# Patient Record
Sex: FEMALE | Race: WHITE | Employment: UNEMPLOYED | URBAN - METROPOLITAN AREA
[De-identification: names, ages, dates, MRNs, and addresses within clinical notes are randomized per-mention and may not be internally consistent; named-entity substitution may affect disease eponyms.]

---

## 2017-08-14 PROBLEM — R74.8 ABNORMAL TRANSAMINASES: Status: ACTIVE | Noted: 2017-08-14

## 2017-08-14 PROBLEM — E11.10 DIABETIC KETOACIDOSIS WITHOUT COMA ASSOCIATED WITH TYPE 2 DIABETES MELLITUS (HCC): Status: ACTIVE | Noted: 2017-08-14

## 2017-08-14 PROBLEM — R10.11 RIGHT UPPER QUADRANT ABDOMINAL PAIN: Status: ACTIVE | Noted: 2017-08-14

## 2018-03-29 ENCOUNTER — HOSPITAL ENCOUNTER (OUTPATIENT)
Dept: GENERAL RADIOLOGY | Age: 58
Discharge: HOME | End: 2018-03-31
Payer: COMMERCIAL

## 2018-03-29 ENCOUNTER — HOSPITAL ENCOUNTER (OUTPATIENT)
Age: 58
Discharge: HOME | End: 2018-03-31
Payer: COMMERCIAL

## 2018-03-29 DIAGNOSIS — M79.644 PAIN OF RIGHT MIDDLE FINGER: ICD-10-CM

## 2018-03-29 PROCEDURE — 73130 X-RAY EXAM OF HAND: CPT

## 2018-08-23 ENCOUNTER — HOSPITAL ENCOUNTER (OUTPATIENT)
Dept: NON INVASIVE DIAGNOSTICS | Age: 58
Discharge: HOME | End: 2018-08-23
Payer: COMMERCIAL

## 2018-08-23 LAB
LV EF: 65 %
LVEF MODALITY: NORMAL

## 2018-08-23 PROCEDURE — 93306 TTE W/DOPPLER COMPLETE: CPT

## 2018-09-10 ENCOUNTER — OFFICE VISIT (OUTPATIENT)
Dept: VASCULAR SURGERY | Age: 58
End: 2018-09-10
Payer: COMMERCIAL

## 2018-09-10 DIAGNOSIS — G57.93 NEUROPATHY OF BOTH FEET: ICD-10-CM

## 2018-09-10 DIAGNOSIS — I89.0 LYMPHEDEMA OF BOTH LOWER EXTREMITIES: Primary | ICD-10-CM

## 2018-09-10 PROCEDURE — G8421 BMI NOT CALCULATED: HCPCS | Performed by: SURGERY

## 2018-09-10 PROCEDURE — 1036F TOBACCO NON-USER: CPT | Performed by: SURGERY

## 2018-09-10 PROCEDURE — 99203 OFFICE O/P NEW LOW 30 MIN: CPT | Performed by: NURSE PRACTITIONER

## 2018-09-10 PROCEDURE — 3017F COLORECTAL CA SCREEN DOC REV: CPT | Performed by: NURSE PRACTITIONER

## 2018-09-10 PROCEDURE — G8427 DOCREV CUR MEDS BY ELIG CLIN: HCPCS | Performed by: NURSE PRACTITIONER

## 2018-09-10 PROCEDURE — 3017F COLORECTAL CA SCREEN DOC REV: CPT | Performed by: SURGERY

## 2018-09-10 PROCEDURE — G8421 BMI NOT CALCULATED: HCPCS | Performed by: NURSE PRACTITIONER

## 2018-09-10 PROCEDURE — G8427 DOCREV CUR MEDS BY ELIG CLIN: HCPCS | Performed by: SURGERY

## 2018-09-10 PROCEDURE — 1036F TOBACCO NON-USER: CPT | Performed by: NURSE PRACTITIONER

## 2018-09-10 RX ORDER — TIZANIDINE 4 MG/1
4 TABLET ORAL EVERY 8 HOURS PRN
COMMUNITY

## 2018-09-10 RX ORDER — POLYETHYLENE GLYCOL 1450
POWDER (GRAM) MISCELLANEOUS
COMMUNITY
End: 2021-04-10 | Stop reason: ALTCHOICE

## 2018-09-10 RX ORDER — TRAMADOL HYDROCHLORIDE 50 MG/1
50 TABLET ORAL
COMMUNITY
End: 2021-04-10 | Stop reason: ALTCHOICE

## 2018-09-10 RX ORDER — CELECOXIB 100 MG/1
100 CAPSULE ORAL 2 TIMES DAILY
COMMUNITY
End: 2021-04-10 | Stop reason: ALTCHOICE

## 2018-09-10 RX ORDER — BIOTIN 1 MG
5000 TABLET ORAL
COMMUNITY
End: 2021-04-10 | Stop reason: ALTCHOICE

## 2018-09-10 NOTE — PROGRESS NOTES
tiZANidine (ZANAFLEX) 4 MG tablet Take 4 mg by mouth every 8 hours as needed      Topiramate (TOPAMAX PO) Take by mouth      traMADol (ULTRAM) 50 MG tablet Take 50 mg by mouth. Susan Oleary Polyethylene Glycol POWD by Does not apply route      MAGNESIUM CITRATE PO Take by mouth      Biotin 1000 MCG TABS Take 5,000 mcg by mouth      Linaclotide (LINZESS PO) Take by mouth      levothyroxine (SYNTHROID) 75 MCG tablet Take 75 mcg by mouth Daily       metoprolol (TOPROL-XL) 25 MG XL tablet Take 25 mg by mouth daily       pregabalin (LYRICA) 100 MG capsule Take 200 mg by mouth 2 times daily      oxyCODONE-acetaminophen (PERCOCET) 5-325 MG per tablet Take 1 tablet by mouth every 8 hours as needed for Pain .  methocarbamol (ROBAXIN) 750 MG tablet Take 750 mg by mouth every 6 hours as needed      meloxicam (MOBIC) 15 MG tablet Take 15 mg by mouth daily      insulin glargine (TOUJEO SOLOSTAR) 300 UNIT/ML injection pen Inject 10 Units into the skin every morning       alogliptin-metformin 12.5-1000 MG TABS Take by mouth 2 times daily      glipiZIDE (GLUCOTROL) 5 MG tablet Take 10 mg by mouth 2 times daily (before meals)       spironolactone (ALDACTONE) 100 MG tablet Take 100 mg by mouth daily. No current facility-administered medications for this visit. Allergies:  Latex; Ancef [cefazolin]; and Codeine  Social History     Social History    Marital status:      Spouse name: N/A    Number of children: N/A    Years of education: N/A     Occupational History    Not on file.      Social History Main Topics    Smoking status: Former Smoker     Packs/day: 1.00     Types: Cigarettes     Quit date: 10/7/2012    Smokeless tobacco: Never Used      Comment: quit 2011    Alcohol use No    Drug use: No    Sexual activity: Not on file     Other Topics Concern    Not on file     Social History Narrative    No narrative on file        Family History   Problem Relation Age of Onset    Cancer Father bladder and colon    Heart Attack Brother          at 46     Labs  Lab Results   Component Value Date    WBC 20.1 (H) 2017    HGB 12.8 2017    HCT 41.2 2017     2017    PROTIME 11.9 2016    INR 1.1 2016    APTT 28.1 2016    K 3.9 2017    BUN 8 2017    CREATININE 0.6 2017     PHYSICAL EXAM:    CONSTITUTIONAL:   Awake, alert, cooperative  PSYCHIATRIC :  Oriented to time, place and person     Appropriate insight to disease process  EYES: Lids and lashes normal  ENT:  External ears and nose without lesions   Hearing deficits absent  NECK: Supple, symmetrical, trachea midline   Thyroid goiter not appreciated   Carotid bruit absent  LUNGS:  No increased work of breathing                 Clear to auscultation  CARDIOVASCULAR:  regular rate and rhythm   ABDOMEN:  soft, non-distended, non-tender   Hernias present   Aorta is not palpable  Lymphatics : Cervical lymphadenopathy absent   SKIN:   Normal skin color   Texture and turgor normal, no induration  EXTREMITIES:   R UE 5/5 strength   No cyanosis noted in nail beds  L UE 5/5 strength   No cyanosis noted in nail beds  R LE Edema present   Varicose veins present   L LE Edema present   Varicose veins present   R femoral  L femoral    R dorsalis pedis triphasic L dorsalis pedis triphasic   R posterior tibial triphasic L posterior tibial triphasic     RADIOLOGY:    A/P Bilateral LE Swelling  Lymphedema  · I explained to them the pathophysiology of lyphedema and the symptoms associated with it  · Pt is unable to tolerate compression stockings  · Recommend lymphedema therapy- referral made  · Pt is unsure if she wants to start because she is trying to lose weight swimming and will be unable to swim in wraps  · Elevate Bilateral LE while in bed or sitting  · F/U as outpatient 3 months after pt has started lymphedema therapy   · She will call for f/u appointment depending on when she starts therapy  · Call if patient develops worsening of swelling or wounds  · All ?s answered    A/p Bilateral diabetic neuropathy  · Continue medical management per Dr. Tatum Thomas    Pt seen and plan reviewed with Dr. Adelso Chandler.      Geraldo Miller, CNP

## 2018-10-08 ENCOUNTER — INITIAL CONSULT (OUTPATIENT)
Dept: SURGERY | Age: 58
End: 2018-10-08
Payer: COMMERCIAL

## 2018-10-08 VITALS
SYSTOLIC BLOOD PRESSURE: 116 MMHG | HEART RATE: 82 BPM | HEIGHT: 66 IN | WEIGHT: 293 LBS | BODY MASS INDEX: 47.09 KG/M2 | DIASTOLIC BLOOD PRESSURE: 78 MMHG

## 2018-10-08 DIAGNOSIS — K43.2 INCISIONAL HERNIA, WITHOUT OBSTRUCTION OR GANGRENE: Primary | ICD-10-CM

## 2018-10-08 DIAGNOSIS — R10.9 ABDOMINAL PAIN, ACUTE: ICD-10-CM

## 2018-10-08 DIAGNOSIS — K59.04 CHRONIC IDIOPATHIC CONSTIPATION: ICD-10-CM

## 2018-10-08 PROCEDURE — G8417 CALC BMI ABV UP PARAM F/U: HCPCS | Performed by: SURGERY

## 2018-10-08 PROCEDURE — 99204 OFFICE O/P NEW MOD 45 MIN: CPT | Performed by: SURGERY

## 2018-10-08 PROCEDURE — G8484 FLU IMMUNIZE NO ADMIN: HCPCS | Performed by: SURGERY

## 2018-10-08 PROCEDURE — G8427 DOCREV CUR MEDS BY ELIG CLIN: HCPCS | Performed by: SURGERY

## 2018-10-08 PROCEDURE — 3017F COLORECTAL CA SCREEN DOC REV: CPT | Performed by: SURGERY

## 2018-10-08 PROCEDURE — 1036F TOBACCO NON-USER: CPT | Performed by: SURGERY

## 2018-10-08 NOTE — PROGRESS NOTES
narrative on file           Review of Systems  Review of Systems -  General ROS: negative for - chills, fatigue or malaise  ENT ROS: negative for - hearing change, nasal congestion or nasal discharge  Allergy and Immunology ROS: negative for - hives, itchy/watery eyes or nasal congestion  Hematological and Lymphatic ROS: negative for - bruising or fatigue  Endocrine ROS: negative for - malaise/lethargy, mood swings, palpitations or polydipsia/polyuria  Respiratory ROS: negative for - sputum changes, stridor, tachypnea or wheezing  Cardiovascular ROS: negative for - irregular heartbeat, loss of consciousness, murmur or orthopnea  Gastrointestinal ROS: negative for -  heartburn or hematemesis  Genito-Urinary ROS: negative for -  genital discharge, genital ulcers or hematuria  Musculoskeletal ROS: negative for - gait disturbance, muscle pain or muscular weakness  Psych/Soc ROS: Neg suicidal ideation or visual/auditory hallucinations    Physical exam:   /78 (Site: Right Upper Arm, Position: Sitting, Cuff Size: Large Adult)   Pulse 82   Ht 5' 6\" (1.676 m)   Wt (!) 304 lb (137.9 kg)   LMP 12/20/2014 Comment: irregular  bleeding  BMI 49.07 kg/m²   General appearance:  NAD  Head: NCAT, PERRLA, EOMI, red conjunctiva  Neck: supple, no masses  Lungs: Equal chest rise bilateral  Heart: Reg rate  Abdomen: soft, minimal epigastric tender, morbidly obese, mild distended, incarcerated umbilical hernia, abdominal wall laxity, no definite recurrent upper abdominal hernia  Rectal: No bleeding  Skin; no lesions  Gu: no cva tenderness  Extremities: bilateral edema  Psychosocial: No auditory or visual hallucinations      Radiology:  CT abdomen/pelvis: She has had one in Aug 2017 and Aug 2018 that were reviewed. She has a moderate umbilical hernia containing fat. She has abdominal wall laxity and thinning of abdominal wall muscles but no recurrent incisional hernia.      Assessment:  Florencio Llamas is a 61 y.o. female with

## 2021-01-26 ENCOUNTER — TELEPHONE (OUTPATIENT)
Dept: SURGERY | Age: 61
End: 2021-01-26

## 2021-01-28 ENCOUNTER — OFFICE VISIT (OUTPATIENT)
Dept: SURGERY | Age: 61
End: 2021-01-28
Payer: COMMERCIAL

## 2021-01-28 ENCOUNTER — HOSPITAL ENCOUNTER (OUTPATIENT)
Age: 61
Discharge: HOME | End: 2021-01-28
Payer: COMMERCIAL

## 2021-01-28 VITALS
TEMPERATURE: 97.6 F | SYSTOLIC BLOOD PRESSURE: 150 MMHG | HEART RATE: 76 BPM | DIASTOLIC BLOOD PRESSURE: 91 MMHG | HEIGHT: 66 IN | BODY MASS INDEX: 47.09 KG/M2 | WEIGHT: 293 LBS

## 2021-01-28 DIAGNOSIS — R10.84 DIFFUSE ABDOMINAL PAIN: Primary | ICD-10-CM

## 2021-01-28 DIAGNOSIS — R10.84 DIFFUSE ABDOMINAL PAIN: ICD-10-CM

## 2021-01-28 LAB
ANION GAP SERPL CALCULATED.3IONS-SCNC: 11 MMOL/L (ref 7–16)
BUN BLDV-MCNC: 13 MG/DL (ref 8–23)
CALCIUM SERPL-MCNC: 9.3 MG/DL (ref 8.6–10.2)
CHLORIDE BLD-SCNC: 107 MMOL/L (ref 98–107)
CO2: 27 MMOL/L (ref 22–29)
CREAT SERPL-MCNC: 0.8 MG/DL (ref 0.5–1)
GFR AFRICAN AMERICAN: >60
GFR NON-AFRICAN AMERICAN: >60 ML/MIN/1.73
GLUCOSE BLD-MCNC: 110 MG/DL (ref 74–99)
POTASSIUM SERPL-SCNC: 4.6 MMOL/L (ref 3.5–5)
SODIUM BLD-SCNC: 145 MMOL/L (ref 132–146)

## 2021-01-28 PROCEDURE — 80048 BASIC METABOLIC PNL TOTAL CA: CPT

## 2021-01-28 PROCEDURE — 36415 COLL VENOUS BLD VENIPUNCTURE: CPT

## 2021-01-28 PROCEDURE — 3017F COLORECTAL CA SCREEN DOC REV: CPT | Performed by: SURGERY

## 2021-01-28 PROCEDURE — G8484 FLU IMMUNIZE NO ADMIN: HCPCS | Performed by: SURGERY

## 2021-01-28 PROCEDURE — 99204 OFFICE O/P NEW MOD 45 MIN: CPT | Performed by: SURGERY

## 2021-01-28 PROCEDURE — G8427 DOCREV CUR MEDS BY ELIG CLIN: HCPCS | Performed by: SURGERY

## 2021-01-28 PROCEDURE — G8417 CALC BMI ABV UP PARAM F/U: HCPCS | Performed by: SURGERY

## 2021-01-28 PROCEDURE — 1036F TOBACCO NON-USER: CPT | Performed by: SURGERY

## 2021-01-28 RX ORDER — DICLOFENAC EPOLAMINE 0.01 G/1
1 SYSTEM TOPICAL 2 TIMES DAILY
COMMUNITY

## 2021-01-28 RX ORDER — LANCETS 28 GAUGE
1 EACH MISCELLANEOUS DAILY
COMMUNITY

## 2021-01-28 RX ORDER — NYSTATIN 10B UNIT
POWDER (EA) MISCELLANEOUS 2 TIMES DAILY
COMMUNITY

## 2021-01-28 NOTE — PROGRESS NOTES
 nystatin (MYCOSTATIN) POWD powder Apply topically 2 times daily      diclofenac (FLECTOR) 1.3 % PTCH patch Place 1 patch onto the skin 2 times daily      diclofenac sodium (VOLTAREN) 1 % GEL Apply topically 2 times daily      FreeStyle Lancets MISC 1 each by Does not apply route daily      blood glucose test strips (ASCENSIA AUTODISC VI;ONE TOUCH ULTRA TEST VI) strip 1 each by In Vitro route daily As needed.  metFORMIN (GLUCOPHAGE) 1000 MG tablet Take 1,000 mg by mouth 2 times daily (with meals)      Dulaglutide (TRULICITY SC) Inject 0.5 mLs into the skin once a week      tiZANidine (ZANAFLEX) 4 MG tablet Take 4 mg by mouth every 8 hours as needed      Topiramate (TOPAMAX PO) Take by mouth      MAGNESIUM CITRATE PO Take by mouth      levothyroxine (SYNTHROID) 75 MCG tablet Take 75 mcg by mouth Daily       metoprolol (TOPROL-XL) 25 MG XL tablet Take 25 mg by mouth daily       celecoxib (CELEBREX) 100 MG capsule Take 100 mg by mouth 2 times daily      Ertugliflozin L-PyroglutamicAc (STEGLATRO) 15 MG TABS Take 15 mg by mouth daily      Insulin Aspart (NOVOLOG SC) Inject 8 Units into the skin      traMADol (ULTRAM) 50 MG tablet Take 50 mg by mouth. Shellie Solders Polyethylene Glycol POWD by Does not apply route      Biotin 1000 MCG TABS Take 5,000 mcg by mouth      pregabalin (LYRICA) 100 MG capsule Take 200 mg by mouth 2 times daily      oxyCODONE-acetaminophen (PERCOCET) 5-325 MG per tablet Take 1 tablet by mouth every 8 hours as needed for Pain .       methocarbamol (ROBAXIN) 750 MG tablet Take 750 mg by mouth every 6 hours as needed      meloxicam (MOBIC) 15 MG tablet Take 15 mg by mouth daily      Linaclotide (LINZESS PO) Take by mouth      insulin glargine (TOUJEO SOLOSTAR) 300 UNIT/ML injection pen Inject 10 Units into the skin every morning       alogliptin-metformin 12.5-1000 MG TABS Take by mouth 2 times daily Emotionally abused: Not on file     Physically abused: Not on file     Forced sexual activity: Not on file   Other Topics Concern    Not on file   Social History Narrative    Not on file           Review of Systems  Review of Systems -  General ROS: negative for - chills, fatigue or malaise  ENT ROS: negative for - hearing change, nasal congestion or nasal discharge  Allergy and Immunology ROS: negative for - hives, itchy/watery eyes or nasal congestion  Hematological and Lymphatic ROS: negative for - blood clots, blood transfusions, bruising or fatigue  Endocrine ROS: negative for - malaise/lethargy, mood swings, palpitations or polydipsia/polyuria  Respiratory ROS: negative for - sputum changes, stridor, tachypnea or wheezing  Cardiovascular ROS: negative for - irregular heartbeat, loss of consciousness, murmur or orthopnea  Gastrointestinal ROS: negative for - constipation, diarrhea, gas/bloating, heartburn or hematemesis  Genito-Urinary ROS: negative for -  genital discharge, genital ulcers or hematuria  Musculoskeletal ROS: negative for - gait disturbance, muscle pain or muscular weakness    Physical exam:   BP (!) 150/91   Pulse 76   Temp 97.6 °F (36.4 °C)   Ht 5' 6\" (1.676 m)   Wt (!) 321 lb (145.6 kg)   LMP 12/20/2014 Comment: irregular  bleeding  BMI 51.81 kg/m²   General appearance:  NAD  Head: NCAT, PERRLA, EOMI, red conjunctiva  Neck: supple, no masses  Lungs: CTAB, equal chest rise bilateral  Heart: Reg rate  Abdomen: soft, moderately tender diffusely, nondistended  Skin; no lesions  Gu: no cva tenderness  Extremities: extremities normal, atraumatic, no cyanosis or edema      Radiology:  CT abdomen/pelvis: pending    Assessment:  63 y.o. female with diffuse severe abdominal pain with complex surgical hx    Plan: Will do CT to assess due to severity and complex surgical hx.      Wanda Gracia MD  11:20 AM  1/28/2021

## 2021-02-08 ENCOUNTER — HOSPITAL ENCOUNTER (OUTPATIENT)
Dept: CT IMAGING | Age: 61
Discharge: HOME | End: 2021-02-08
Payer: COMMERCIAL

## 2021-02-08 DIAGNOSIS — R10.84 DIFFUSE ABDOMINAL PAIN: ICD-10-CM

## 2021-02-08 PROCEDURE — 6360000004 HC RX CONTRAST MEDICATION: Performed by: RADIOLOGY

## 2021-02-08 PROCEDURE — 74177 CT ABD & PELVIS W/CONTRAST: CPT

## 2021-02-08 RX ADMIN — IOHEXOL 50 ML: 240 INJECTION, SOLUTION INTRATHECAL; INTRAVASCULAR; INTRAVENOUS; ORAL at 10:41

## 2021-02-08 RX ADMIN — IOPAMIDOL 75 ML: 755 INJECTION, SOLUTION INTRAVENOUS at 10:41

## 2021-02-11 ENCOUNTER — OFFICE VISIT (OUTPATIENT)
Dept: SURGERY | Age: 61
End: 2021-02-11
Payer: COMMERCIAL

## 2021-02-11 VITALS
TEMPERATURE: 96.4 F | WEIGHT: 293 LBS | OXYGEN SATURATION: 97 % | HEIGHT: 66 IN | BODY MASS INDEX: 47.09 KG/M2 | SYSTOLIC BLOOD PRESSURE: 140 MMHG | DIASTOLIC BLOOD PRESSURE: 87 MMHG | HEART RATE: 75 BPM

## 2021-02-11 DIAGNOSIS — K43.2 INCISIONAL HERNIA, WITHOUT OBSTRUCTION OR GANGRENE: Primary | ICD-10-CM

## 2021-02-11 PROCEDURE — G8484 FLU IMMUNIZE NO ADMIN: HCPCS | Performed by: SURGERY

## 2021-02-11 PROCEDURE — G8417 CALC BMI ABV UP PARAM F/U: HCPCS | Performed by: SURGERY

## 2021-02-11 PROCEDURE — 3017F COLORECTAL CA SCREEN DOC REV: CPT | Performed by: SURGERY

## 2021-02-11 PROCEDURE — G8427 DOCREV CUR MEDS BY ELIG CLIN: HCPCS | Performed by: SURGERY

## 2021-02-11 PROCEDURE — 99213 OFFICE O/P EST LOW 20 MIN: CPT | Performed by: SURGERY

## 2021-02-11 PROCEDURE — 1036F TOBACCO NON-USER: CPT | Performed by: SURGERY

## 2021-02-11 NOTE — PROGRESS NOTES
General Surgery History and Physical    Patient's Name/Date of Birth: Franco Garner / 1960    Date: 2021     Surgeon: Jan Minaya M.D.    PCP: Amarilys Luna DO     Chief Complaint: incisional hernia    HPI:   Franco Garner is a 63 y.o. female who presents for evaluation of incisional hernia. It has become larger and more painful recently and the pt wants repair.     Past Medical History:   Diagnosis Date    Arthritis     left shoulder    Constipation     Diabetes mellitus ([address]     Headache     History of hernia repair     HX OTHER MEDICAL     hard to wake after anesth    Hypertension     Neck pain     Numbness     Obesities, morbid ([address]     Other abnormal clinical finding dec 2014     hospitalized [facility] pneumonia had ercp blood infection     Pain     Pneumonia 4-5 yrs ago also 2014    Postmenopausal bleeding 2015    Thyroid disease        Past Surgical History:   Procedure Laterality Date    ABDOMEN SURGERY  10/26/2012    hernia    ABDOMINAL WALL SURGERY  2012     Abdominal Wound Dehiscence Repair    APPENDECTOMY      CARPAL TUNNEL RELEASE       SECTION      x2    CHOLECYSTECTOMY      open    COLONOSCOPY      DILATION AND CURETTAGE OF UTERUS  2015    hysteroscopy    ERCP  dec 2014   [address]  2013    exploratory laparotomy, lysis of adhesions (85 mins), repair of ventral incisional hernia with biologic mesh ([place]); bilateral myofascial release ([place])    NECK SURGERY       stacie in neck    [place] COLSC FLX W/RMVL OF TUMOR POLYP LESION SNARE TQ  2013    Dr. Rusty Ace st e s    TONSILLECTOMY      UPPER GASTROINTESTINAL ENDOSCOPY         Current Outpatient Medications   Medication Sig Dispense Refill    nystatin (MYCOSTATIN) POWD powder Apply topically 2 times daily      diclofenac (FLECTOR) 1.3 % PTCH patch Place 1 patch onto the skin 2 times daily Allergies   Allergen Reactions    Latex Rash    Ancef [Cefazolin] Shortness Of Breath     Nausea, itching, shortness of breath, flushing, reddness    Norco [Hydrocodone-Acetaminophen]      Went into shock     Codeine Other (See Comments)     \"makes me catatonic\"       The patient has a family history that is negative for severe cardiovascular or respiratory issues, negative for reaction to anesthesia.     Social History     Socioeconomic History    Marital status:      Spouse name: Not on file    Number of children: Not on file    Years of education: Not on file    Highest education level: Not on file   Occupational History    Not on file   Social Needs    Financial resource strain: Not on file    Food insecurity     Worry: Not on file     Inability: Not on file    Transportation needs     Medical: Not on file     Non-medical: Not on file   Tobacco Use    Smoking status: Former Smoker     Packs/day: 1.00     Types: Cigarettes     Quit date: 10/7/2012     Years since quittin.3    Smokeless tobacco: Never Used    Tobacco comment: quit    Substance and Sexual Activity    Alcohol use: No    Drug use: No    Sexual activity: Not on file   Lifestyle    Physical activity     Days per week: Not on file     Minutes per session: Not on file    Stress: Not on file   Relationships    Social connections     Talks on phone: Not on file     Gets together: Not on file     Attends Faith service: Not on file     Active member of club or organization: Not on file     Attends meetings of clubs or organizations: Not on file     Relationship status: Not on file    Intimate partner violence     Fear of current or ex partner: Not on file     Emotionally abused: Not on file     Physically abused: Not on file     Forced sexual activity: Not on file   Other Topics Concern    Not on file   Social History Narrative    Not on file           Review of Systems  Review of Systems - Discussed the risk, benefits and alternatives of surgery including wound infections, bleeding, scar and hernia formation and the risks of general anesthetic including MI, CVA, sudden death or reactions to anesthetic medications. The patient understands the risks and alternatives and the possibility of converting to an open procedure. All questions were answered to the patient's satisfaction and they freely signed the consent.       Shimon Chapa MD  10:38 AM  2/11/2021

## 2021-04-10 ENCOUNTER — HOSPITAL ENCOUNTER (EMERGENCY)
Age: 61
Discharge: HOME | End: 2021-04-10
Attending: EMERGENCY MEDICINE
Payer: COMMERCIAL

## 2021-04-10 ENCOUNTER — APPOINTMENT (OUTPATIENT)
Dept: CT IMAGING | Age: 61
End: 2021-04-10
Payer: COMMERCIAL

## 2021-04-10 VITALS
SYSTOLIC BLOOD PRESSURE: 132 MMHG | RESPIRATION RATE: 18 BRPM | HEIGHT: 66 IN | WEIGHT: 293 LBS | TEMPERATURE: 97.3 F | DIASTOLIC BLOOD PRESSURE: 80 MMHG | HEART RATE: 77 BPM | OXYGEN SATURATION: 99 % | BODY MASS INDEX: 47.09 KG/M2

## 2021-04-10 DIAGNOSIS — R10.9 FLANK PAIN: Primary | ICD-10-CM

## 2021-04-10 DIAGNOSIS — R30.0 DYSURIA: ICD-10-CM

## 2021-04-10 LAB
BACTERIA: ABNORMAL /HPF
BILIRUBIN URINE: NEGATIVE
BLOOD, URINE: ABNORMAL
CLARITY: CLEAR
COLOR: YELLOW
GLUCOSE URINE: NEGATIVE MG/DL
KETONES, URINE: NEGATIVE MG/DL
LEUKOCYTE ESTERASE, URINE: ABNORMAL
NITRITE, URINE: NEGATIVE
PH UA: 6.5 (ref 5–9)
PROTEIN UA: NEGATIVE MG/DL
RBC UA: ABNORMAL /HPF (ref 0–2)
SPECIFIC GRAVITY UA: 1.01 (ref 1–1.03)
UROBILINOGEN, URINE: 0.2 E.U./DL
WBC UA: ABNORMAL /HPF (ref 0–5)

## 2021-04-10 PROCEDURE — 96372 THER/PROPH/DIAG INJ SC/IM: CPT

## 2021-04-10 PROCEDURE — 81001 URINALYSIS AUTO W/SCOPE: CPT

## 2021-04-10 PROCEDURE — 6360000002 HC RX W HCPCS: Performed by: EMERGENCY MEDICINE

## 2021-04-10 PROCEDURE — 99283 EMERGENCY DEPT VISIT LOW MDM: CPT

## 2021-04-10 PROCEDURE — 74176 CT ABD & PELVIS W/O CONTRAST: CPT

## 2021-04-10 PROCEDURE — 87088 URINE BACTERIA CULTURE: CPT

## 2021-04-10 RX ORDER — OXYCODONE HYDROCHLORIDE AND ACETAMINOPHEN 5; 325 MG/1; MG/1
1 TABLET ORAL EVERY 6 HOURS PRN
Qty: 5 TABLET | Refills: 0 | Status: SHIPPED | OUTPATIENT
Start: 2021-04-10 | End: 2021-04-12

## 2021-04-10 RX ORDER — CHLORZOXAZONE 500 MG/1
500 TABLET ORAL 3 TIMES DAILY PRN
Qty: 15 TABLET | Refills: 0 | Status: SHIPPED | OUTPATIENT
Start: 2021-04-10 | End: 2021-04-15

## 2021-04-10 RX ORDER — KETOROLAC TROMETHAMINE 30 MG/ML
30 INJECTION, SOLUTION INTRAMUSCULAR; INTRAVENOUS ONCE
Status: COMPLETED | OUTPATIENT
Start: 2021-04-10 | End: 2021-04-10

## 2021-04-10 RX ORDER — LEVOFLOXACIN 500 MG/1
500 TABLET, FILM COATED ORAL DAILY
Qty: 7 TABLET | Refills: 0 | Status: SHIPPED | OUTPATIENT
Start: 2021-04-10 | End: 2021-04-16

## 2021-04-10 RX ADMIN — KETOROLAC TROMETHAMINE 30 MG: 30 INJECTION, SOLUTION INTRAMUSCULAR at 17:12

## 2021-04-10 ASSESSMENT — PAIN DESCRIPTION - ORIENTATION: ORIENTATION: RIGHT

## 2021-04-10 ASSESSMENT — PAIN DESCRIPTION - LOCATION: LOCATION: FLANK

## 2021-04-12 LAB — URINE CULTURE, ROUTINE: NORMAL

## 2021-04-13 NOTE — ED PROVIDER NOTES
Codeine    -------------------------------------------------- RESULTS -------------------------------------------------  All laboratory and radiology results have been personally reviewed by myself   LABS:  Results for orders placed or performed during the hospital encounter of 04/10/21   Culture, Urine    Specimen: Urine, clean catch   Result Value Ref Range    Urine Culture, Routine       10 to 100,000 CFU/mL  Mixed rishi isolated. Further workup and sensitivity testing  is not routinely indicated and will not be performed. Mixed rishi isolated includes:  Mixed gram positive organisms  Mixed gram negative rods     Urinalysis   Result Value Ref Range    Color, UA Yellow Straw/Yellow    Clarity, UA Clear Clear    Glucose, Ur Negative Negative mg/dL    Bilirubin Urine Negative Negative    Ketones, Urine Negative Negative mg/dL    Specific Gravity, UA 1.010 1.005 - 1.030    Blood, Urine MODERATE (A) Negative    pH, UA 6.5 5.0 - 9.0    Protein, UA Negative Negative mg/dL    Urobilinogen, Urine 0.2 <2.0 E.U./dL    Nitrite, Urine Negative Negative    Leukocyte Esterase, Urine TRACE (A) Negative   Microscopic Urinalysis   Result Value Ref Range    WBC, UA 2-5 0 - 5 /HPF    RBC, UA 2-5 0 - 2 /HPF    Bacteria, UA MODERATE (A) None Seen /HPF       RADIOLOGY:  Interpreted by Radiologist.  CT ABDOMEN PELVIS WO CONTRAST Additional Contrast? None   Final Result   1. No renal calculus or obstructive uropathy. 2.  Status post cholecystectomy. More likely remnant of the cystic duct with   some calcifications seen in the site of the previous gallbladder fossa. This   can be additional evaluated by gallbladder ultrasound. Could be on more   routine basis as a chronic finding observed on the study of December 2014. 3.  No acute inflammatory process in the omental/mesentery fat planes. The   free intraperitoneal air or ascites.       4.  Fluid contents in the left side of the colon can represent a diarrhea or   impending at this time and they are agreeable with the plan.      --------------------------------- IMPRESSION AND DISPOSITION ---------------------------------    IMPRESSION  1. Flank pain    2.  Dysuria        DISPOSITION  Disposition: Discharge to home  Patient condition is fair                 Moe Cabrera MD  04/13/21 [id number]

## 2021-04-16 ENCOUNTER — OFFICE VISIT (OUTPATIENT)
Dept: CARDIOLOGY CLINIC | Age: 61
End: 2021-04-16
Payer: COMMERCIAL

## 2021-04-16 VITALS
OXYGEN SATURATION: 96 % | HEIGHT: 66 IN | BODY MASS INDEX: 47.09 KG/M2 | SYSTOLIC BLOOD PRESSURE: 118 MMHG | RESPIRATION RATE: 18 BRPM | HEART RATE: 81 BPM | WEIGHT: 293 LBS | DIASTOLIC BLOOD PRESSURE: 72 MMHG

## 2021-04-16 DIAGNOSIS — Z01.818 PREOPERATIVE CLEARANCE: Primary | ICD-10-CM

## 2021-04-16 DIAGNOSIS — K43.2 INCISIONAL HERNIA, WITHOUT OBSTRUCTION OR GANGRENE: ICD-10-CM

## 2021-04-16 PROCEDURE — 93000 ELECTROCARDIOGRAM COMPLETE: CPT | Performed by: INTERNAL MEDICINE

## 2021-04-16 PROCEDURE — 1036F TOBACCO NON-USER: CPT | Performed by: INTERNAL MEDICINE

## 2021-04-16 PROCEDURE — G8417 CALC BMI ABV UP PARAM F/U: HCPCS | Performed by: INTERNAL MEDICINE

## 2021-04-16 PROCEDURE — G8427 DOCREV CUR MEDS BY ELIG CLIN: HCPCS | Performed by: INTERNAL MEDICINE

## 2021-04-16 PROCEDURE — 99204 OFFICE O/P NEW MOD 45 MIN: CPT | Performed by: INTERNAL MEDICINE

## 2021-04-16 PROCEDURE — 3017F COLORECTAL CA SCREEN DOC REV: CPT | Performed by: INTERNAL MEDICINE

## 2021-04-16 RX ORDER — LACTOSE-REDUCED FOOD/FIBER
LIQUID (ML) ORAL
COMMUNITY
Start: 2021-03-10 | End: 2021-12-10

## 2021-06-08 ENCOUNTER — HOSPITAL ENCOUNTER (OUTPATIENT)
Dept: ULTRASOUND IMAGING | Age: 61
Discharge: HOME | End: 2021-06-08
Payer: COMMERCIAL

## 2021-06-08 DIAGNOSIS — R60.9 SWELLING: ICD-10-CM

## 2021-06-08 PROCEDURE — 93971 EXTREMITY STUDY: CPT

## 2021-06-29 ENCOUNTER — HOSPITAL ENCOUNTER (OUTPATIENT)
Dept: GENERAL RADIOLOGY | Age: 61
Discharge: HOME | End: 2021-07-01
Payer: COMMERCIAL

## 2021-06-29 ENCOUNTER — HOSPITAL ENCOUNTER (OUTPATIENT)
Age: 61
Discharge: HOME | End: 2021-07-01
Payer: COMMERCIAL

## 2021-06-29 DIAGNOSIS — M25.561 RIGHT KNEE PAIN, UNSPECIFIED CHRONICITY: ICD-10-CM

## 2021-06-29 DIAGNOSIS — M25.562 ACUTE PAIN OF LEFT KNEE: ICD-10-CM

## 2021-06-29 PROCEDURE — 73560 X-RAY EXAM OF KNEE 1 OR 2: CPT

## 2021-08-02 ENCOUNTER — OFFICE VISIT (OUTPATIENT)
Dept: ORTHOPEDIC SURGERY | Age: 61
End: 2021-08-02
Payer: COMMERCIAL

## 2021-08-02 VITALS — HEIGHT: 66 IN | TEMPERATURE: 98 F | BODY MASS INDEX: 47.09 KG/M2 | WEIGHT: 293 LBS

## 2021-08-02 DIAGNOSIS — M17.12 PRIMARY OSTEOARTHRITIS OF LEFT KNEE: ICD-10-CM

## 2021-08-02 DIAGNOSIS — M17.11 PRIMARY OSTEOARTHRITIS OF RIGHT KNEE: Primary | ICD-10-CM

## 2021-08-02 PROCEDURE — G8427 DOCREV CUR MEDS BY ELIG CLIN: HCPCS | Performed by: ORTHOPAEDIC SURGERY

## 2021-08-02 PROCEDURE — G8417 CALC BMI ABV UP PARAM F/U: HCPCS | Performed by: ORTHOPAEDIC SURGERY

## 2021-08-02 PROCEDURE — 3017F COLORECTAL CA SCREEN DOC REV: CPT | Performed by: ORTHOPAEDIC SURGERY

## 2021-08-02 PROCEDURE — 99204 OFFICE O/P NEW MOD 45 MIN: CPT | Performed by: ORTHOPAEDIC SURGERY

## 2021-08-02 PROCEDURE — 1036F TOBACCO NON-USER: CPT | Performed by: ORTHOPAEDIC SURGERY

## 2021-08-02 NOTE — PROGRESS NOTES
Chief Complaint   Patient presents with    Knee Pain     Bilateral Knee x 1 month, L>R, no known injury, no previous knee surgery, referral from [address] [place] [address] [address]       Subjective:     Patient ID: Irina Dickey is a 63 y.o..  female    Knee Pain  Patient complains of bilateral knee pain. This is evaluated as a personal injury. There was not a history of injury. The pain began 1 month ago. The pain is located medial, lateral, anterior. She describes  Her symptoms as aching. She has experienced popping, clicking, locking, and giving way in the affected knee. The patient has had pain with kneeling, squating, and climbing stairs. Symptoms improve with rest. The symptoms are worse with activity, stair climbing, kneeling. The knee has not given out or felt unstable. The patient cannot bend and straighten the knee fully. The patient is active in none. Treatment to date has been ice, heat, Tylenol, NSAID's, without significant relief.      Past Medical History:   Diagnosis Date    Arthritis     left shoulder    Constipation     Diabetes mellitus ([address]     Headache     History of hernia repair     HX OTHER MEDICAL     hard to wake after anesth    Hypertension     Neck pain     Numbness     Obesities, morbid (HCC)     Other abnormal clinical finding dec 2014     hospitalized [facility] pneumonia had ercp blood infection     Pain     Pneumonia 4-5 yrs ago also 2014    Postmenopausal bleeding 2015    Thyroid disease      Past Surgical History:   Procedure Laterality Date    ABDOMEN SURGERY  10/26/2012    hernia    ABDOMINAL WALL SURGERY  2012     Abdominal Wound Dehiscence Repair    APPENDECTOMY      CARPAL TUNNEL RELEASE       SECTION      x2    CHOLECYSTECTOMY  2011    open    COLONOSCOPY      DILATION AND CURETTAGE OF UTERUS  2015    hysteroscopy    ERCP  dec 2014   [address]  2013    exploratory laparotomy, lysis of adhesions (85 mins), repair of ventral incisional hernia with biologic mesh (Dorion); bilateral myofascial release (Cash)    NECK SURGERY  2005     stacie in neck    [place] COLSC FLX W/RMVL OF TUMOR POLYP LESION SNARE TQ  6/19/2013    Dr. Maria De Jesus Cerr[address] st e s    TONSILLECTOMY      UPPER GASTROINTESTINAL ENDOSCOPY         Current Outpatient Medications:     Nutritional Supplements (BOOST GLUCOSE CONTROL) LIQD, DRINK 1 BOTTLE BY MOUTH THREE TIMES A DAY., Disp: , Rfl:     nystatin (MYCOSTATIN) POWD powder, Apply topically 2 times daily, Disp: , Rfl:     diclofenac (FLECTOR) 1.3 % PTCH patch, Place 1 patch onto the skin 2 times daily, Disp: , Rfl:     diclofenac sodium (VOLTAREN) 1 % GEL, Apply topically 2 times daily, Disp: , Rfl:     FreeStyle Lancets MISC, 1 each by Does not apply route daily, Disp: , Rfl:     blood glucose test strips (ASCENSIA AUTODISC VI;ONE TOUCH ULTRA TEST VI) strip, 1 each by In Vitro route daily As needed. , Disp: , Rfl:     metFORMIN (GLUCOPHAGE) 1000 MG tablet, Take 1,000 mg by mouth 2 times daily (with meals), Disp: , Rfl:     Dulaglutide (TRULICITY SC), Inject 0.5 mLs into the skin once a week, Disp: , Rfl:     tiZANidine (ZANAFLEX) 4 MG tablet, Take 4 mg by mouth every 8 hours as needed, Disp: , Rfl:     Topiramate (TOPAMAX PO), Take 100 mg by mouth 2 times daily , Disp: , Rfl:     levothyroxine (SYNTHROID) 75 MCG tablet, Take 75 mcg by mouth Daily , Disp: , Rfl:     metoprolol (TOPROL-XL) 25 MG XL tablet, Take 25 mg by mouth daily , Disp: , Rfl:   Allergies   Allergen Reactions    Latex Rash    Ancef [Cefazolin] Shortness Of Breath     Nausea, itching, shortness of breath, flushing, reddness    Norco [Hydrocodone-Acetaminophen]      Went into shock     Codeine Other (See Comments)     \"makes me catatonic\"     Social History     Socioeconomic History    Marital status:      Spouse name: Not on file    Number of children: Not on file    Years of education: Not on file    Highest education level: Not on file   Occupational History    Not on file   Tobacco Use    Smoking status: Former Smoker     Packs/day: 1.00     Types: Cigarettes     Quit date: 10/7/2012     Years since quittin.8    Smokeless tobacco: Never Used    Tobacco comment: quit    Vaping Use    Vaping Use: Never used   Substance and Sexual Activity    Alcohol use: No     Comment: 1 cup of coffee a day     Drug use: No    Sexual activity: Not on file   Other Topics Concern    Not on file   Social History Narrative    Not on file     Social Determinants of Health     Financial Resource Strain:     Difficulty of Paying Living Expenses:    Food Insecurity:     Worried About Running Out of Food in the Last Year:     [address] in the Last Year:    Transportation Needs:     Lack of Transportation (Medical):  Lack of Transportation (Non-Medical):    Physical Activity:     Days of Exercise per Week:     Minutes of Exercise per Session:    Stress:     Feeling of Stress :    Social Connections:     Frequency of Communication with Friends and Family:     Frequency of Social Gatherings with Friends and Family:     Attends Rastafari Services:     Active Member of Clubs or Organizations:     Attends Club or Organization Meetings:     Marital Status:    Intimate Partner Violence:     Fear of Current or Ex-Partner:     Emotionally Abused:     Physically Abused:     Sexually Abused:      Family History   Problem Relation Age of Onset    Cancer Father         bladder and colon    Heart Attack Brother          at 46         REVIEW OF SYSTEMS:     General/Constitution:  (-)weight loss, (-)fever, (-)chills, (-)weakness. Skin: (-) rash,(-) psoriasis,(-) eczema, (-)skin cancer.    Musculoskeletal: (-) fractures,  (-) dislocations,(-) collagen vascular disease, (-) fibromyalgia, (-) multiple sclerosis, (-) muscular dystrophy, (-) RSD,(-) joint pain (-)swelling, (-) joint extremities. Lymph:  Upon palpation,  there is no lymphadenopathy noted in bilateral lower extremities. Musculoskeletal:  Gait: antalgic; examination of the nails and digits reveal no cyanosis or clubbing. Lumbar exam:  On visual inspection, there is not deformity of the spine. full range of motion, no tenderness, palpable spasm or pain on motion. Special tests: Straight Leg Raise negative, Vel test negative. Hip exam:   Upon inspection, there is not deformity noted. Upon palpation there is not tenderness. ROM: is  full and symmetrical.   Strength: Hip Flexors 5/5; Hip Abductors 5/5; Hip Adduction 5/5. Knee exam:  Bilateral knee exam shows;  range of motion of R. Knee is 0 to 115, and L. Knee is 5 to 100. The patient does have  pain on motion, effusion is mild, there is tenderness over the  medial, lateral region, there are not any masses, there is not ligamentous instability, there is not  deformity noted. Knee exam: bilateral positive for moderate crepitations, some mild tenderness laxity is not noted with  stress. There is not a popliteal cyst.    R. Knee:  Lachman's negative, Anterior Drawer negative, Posterior Drawer negative  Serenity's positive, Thallasy  positive,   PF grind test positive, Apprehension test negative, Patellar J sign  negative  L. Knee:  Lachman's negative, Anterior Drawer negative, Posterior Drawer negative  Serenity's positive, Thallasy  positive,   PF grind test positive, Apprehension test negative,  Patellar J sign  negative    Xray Exam:  Osteoarthritis which is at worst mild at the right knee and minimal at the   left knee.           Radiographic findings reviewed with patient    Assessment:  Encounter Diagnoses   Name Primary?  Primary osteoarthritis of right knee Yes    Primary osteoarthritis of left knee        Plan:  Natural history and expected course discussed. Questions answered. Educational materials distributed.   Rest, ice, compression, and elevation (RICE) therapy. Reduction in offending activity. I had a lengthy discussion with the patient regarding their diagnosis. I explained treatment options including surgical vs non surgical treatment. I reviewed in detail the risks and benefits and outlined the procedure in detail with expected outcomes and possible complications. I also discussed non surgical treatment such as injections (CSI and visco supplementation), physical therapy, topical creams and NSAID's. They have elected for conservative management at this time. The patient has failed conservative measures such as NSAIDS, HEP, and cortisone injections. She is an excellent candidate for Zilretta injections  in the Bilateral knee.  We will contact the patient's insurance company and see them back in the office once we have received approval.

## 2021-08-26 ENCOUNTER — OFFICE VISIT (OUTPATIENT)
Dept: SURGERY | Age: 61
End: 2021-08-26
Payer: COMMERCIAL

## 2021-08-26 VITALS
BODY MASS INDEX: 47.09 KG/M2 | HEART RATE: 82 BPM | TEMPERATURE: 97 F | HEIGHT: 66 IN | SYSTOLIC BLOOD PRESSURE: 139 MMHG | WEIGHT: 293 LBS | DIASTOLIC BLOOD PRESSURE: 76 MMHG

## 2021-08-26 DIAGNOSIS — K43.2 INCISIONAL HERNIA, WITHOUT OBSTRUCTION OR GANGRENE: Primary | ICD-10-CM

## 2021-08-26 PROCEDURE — 1036F TOBACCO NON-USER: CPT | Performed by: SURGERY

## 2021-08-26 PROCEDURE — 3017F COLORECTAL CA SCREEN DOC REV: CPT | Performed by: SURGERY

## 2021-08-26 PROCEDURE — G8427 DOCREV CUR MEDS BY ELIG CLIN: HCPCS | Performed by: SURGERY

## 2021-08-26 PROCEDURE — G8417 CALC BMI ABV UP PARAM F/U: HCPCS | Performed by: SURGERY

## 2021-08-26 PROCEDURE — 99213 OFFICE O/P EST LOW 20 MIN: CPT | Performed by: SURGERY

## 2021-08-26 RX ORDER — HYDROCODONE BITARTRATE AND ACETAMINOPHEN 5; 325 MG/1; MG/1
TABLET ORAL
COMMUNITY
End: 2021-12-10

## 2021-08-26 RX ORDER — OXYCODONE HYDROCHLORIDE AND ACETAMINOPHEN 5; 325 MG/1; MG/1
TABLET ORAL
Status: ON HOLD | COMMUNITY
Start: 2021-07-29 | End: 2021-12-16 | Stop reason: HOSPADM

## 2021-08-26 RX ORDER — GABAPENTIN 300 MG/1
CAPSULE ORAL
COMMUNITY
Start: 2021-07-27

## 2021-08-26 NOTE — PROGRESS NOTES
General Surgery History and Physical    Patient's Name/Date of Birth: Vanda Oneill / 1960    Date: 2021     Surgeon: Sandie Herbert M.D.    PCP: Kinjal Garces DO     Chief Complaint: incisional hernia    HPI:   Vanda Oneill is a 63 y.o. female who presents for evaluation of incisional hernia. It has become larger and more painful recently and the pt wants repair. Past Medical History:   Diagnosis Date    Arthritis     left shoulder    Constipation     Diabetes mellitus ([address]     Headache     History of hernia repair     HX OTHER MEDICAL     hard to wake after anesth    Hypertension     Neck pain     Numbness     Obesities, morbid ([address]     Other abnormal clinical finding dec 2014     hospitalized [facility] pneumonia had ercp blood infection     Pain     Pneumonia 4-5 yrs ago also 2014    Postmenopausal bleeding 2015    Thyroid disease        Past Surgical History:   Procedure Laterality Date    ABDOMEN SURGERY  10/26/2012    hernia    ABDOMINAL WALL SURGERY  2012     Abdominal Wound Dehiscence Repair    APPENDECTOMY      CARPAL TUNNEL RELEASE       SECTION      x2    CHOLECYSTECTOMY      open    COLONOSCOPY      DILATION AND CURETTAGE OF UTERUS  2015    hysteroscopy    ERCP  dec 2014   [address]  2013    exploratory laparotomy, lysis of adhesions (85 mins), repair of ventral incisional hernia with biologic mesh (Dorion); bilateral myofascial release (Cash)    NECK SURGERY       stacie in neck    [place] COLSC FLX W/RMVL OF TUMOR POLYP LESION SNARE TQ  2013    Dr. Cecy Quiroga  e s    TONSILLECTOMY      UPPER GASTROINTESTINAL ENDOSCOPY         Current Outpatient Medications   Medication Sig Dispense Refill    gabapentin (NEURONTIN) 300 MG capsule take 2 capsules by mouth three times a day      HYDROcodone-acetaminophen (NORCO) 5-325 MG per tablet Take by mouth.       oxyCODONE-acetaminophen (PERCOCET) 5-325 MG per tablet take 1/2 to 1 tablet by mouth twice a day if needed SPARINGLY FOR SEVERE PAIN for 30 DAYS      Nutritional Supplements (BOOST GLUCOSE CONTROL) LIQD DRINK 1 BOTTLE BY MOUTH THREE TIMES A DAY.  nystatin (MYCOSTATIN) POWD powder Apply topically 2 times daily      diclofenac (FLECTOR) 1.3 % PTCH patch Place 1 patch onto the skin 2 times daily      diclofenac sodium (VOLTAREN) 1 % GEL Apply topically 2 times daily      FreeStyle Lancets MISC 1 each by Does not apply route daily      blood glucose test strips (ASCENSIA AUTODISC VI;ONE TOUCH ULTRA TEST VI) strip 1 each by In Vitro route daily As needed.  metFORMIN (GLUCOPHAGE) 1000 MG tablet Take 1,000 mg by mouth 2 times daily (with meals)      Dulaglutide (TRULICITY SC) Inject 0.5 mLs into the skin once a week      tiZANidine (ZANAFLEX) 4 MG tablet Take 4 mg by mouth every 8 hours as needed      Topiramate (TOPAMAX PO) Take 100 mg by mouth 2 times daily       levothyroxine (SYNTHROID) 75 MCG tablet Take 75 mcg by mouth Daily       metoprolol (TOPROL-XL) 25 MG XL tablet Take 25 mg by mouth daily        No current facility-administered medications for this visit. Allergies   Allergen Reactions    Latex Rash    Ancef [Cefazolin] Shortness Of Breath     Nausea, itching, shortness of breath, flushing, reddness    Norco [Hydrocodone-Acetaminophen]      Went into shock     Codeine Other (See Comments)     \"makes me catatonic\"       The patient has a family history that is negative for severe cardiovascular or respiratory issues, negative for reaction to anesthesia.     Social History     Socioeconomic History    Marital status:      Spouse name: Not on file    Number of children: Not on file    Years of education: Not on file    Highest education level: Not on file   Occupational History    Not on file   Tobacco Use    Smoking status: Former Smoker     Packs/day: 1.00     Types: Cigarettes     Quit date: 10/7/2012     Years since quittin.8    Smokeless tobacco: Never Used    Tobacco comment: quit    Vaping Use    Vaping Use: Never used   Substance and Sexual Activity    Alcohol use: No     Comment: 1 cup of coffee a day     Drug use: No    Sexual activity: Not on file   Other Topics Concern    Not on file   Social History Narrative    Not on file     Social Determinants of Health     Financial Resource Strain:     Difficulty of Paying Living Expenses:    Food Insecurity:     Worried About Running Out of Food in the Last Year:     [address] in the Last Year:    Transportation Needs:     Lack of Transportation (Medical):      Lack of Transportation (Non-Medical):    Physical Activity:     Days of Exercise per Week:     Minutes of Exercise per Session:    Stress:     Feeling of Stress :    Social Connections:     Frequency of Communication with Friends and Family:     Frequency of Social Gatherings with Friends and Family:     Attends Yazidi Services:     Active Member of Clubs or Organizations:     Attends Club or Organization Meetings:     Marital Status:    Intimate Partner Violence:     Fear of Current or Ex-Partner:     Emotionally Abused:     Physically Abused:     Sexually Abused:            Review of Systems  Review of Systems -  General ROS: negative for - chills, fatigue or malaise  ENT ROS: negative for - hearing change, nasal congestion or nasal discharge  Allergy and Immunology ROS: negative for - hives, itchy/watery eyes or nasal congestion  Hematological and Lymphatic ROS: negative for - blood clots, blood transfusions, bruising or fatigue  Endocrine ROS: negative for - malaise/lethargy, mood swings, palpitations or polydipsia/polyuria  Respiratory ROS: negative for - sputum changes, stridor, tachypnea or wheezing  Cardiovascular ROS: negative for - irregular heartbeat, loss of consciousness, murmur or orthopnea  Gastrointestinal ROS:

## 2021-09-29 ENCOUNTER — NURSE ONLY (OUTPATIENT)
Dept: ORTHOPEDIC SURGERY | Age: 61
End: 2021-09-29
Payer: COMMERCIAL

## 2021-09-29 DIAGNOSIS — M17.12 PRIMARY OSTEOARTHRITIS OF LEFT KNEE: ICD-10-CM

## 2021-09-29 DIAGNOSIS — M17.11 PRIMARY OSTEOARTHRITIS OF RIGHT KNEE: Primary | ICD-10-CM

## 2021-09-29 PROCEDURE — 20610 DRAIN/INJ JOINT/BURSA W/O US: CPT | Performed by: NURSE PRACTITIONER

## 2021-09-29 PROCEDURE — 99999 PR OFFICE/OUTPT VISIT,PROCEDURE ONLY: CPT | Performed by: NURSE PRACTITIONER

## 2021-10-27 ENCOUNTER — OFFICE VISIT (OUTPATIENT)
Dept: ORTHOPEDIC SURGERY | Age: 61
End: 2021-10-27
Payer: COMMERCIAL

## 2021-10-27 VITALS — BODY MASS INDEX: 47.09 KG/M2 | WEIGHT: 293 LBS | TEMPERATURE: 98 F | HEIGHT: 66 IN

## 2021-10-27 DIAGNOSIS — M17.12 PRIMARY OSTEOARTHRITIS OF LEFT KNEE: ICD-10-CM

## 2021-10-27 DIAGNOSIS — M17.11 PRIMARY OSTEOARTHRITIS OF RIGHT KNEE: Primary | ICD-10-CM

## 2021-10-27 PROCEDURE — 99212 OFFICE O/P EST SF 10 MIN: CPT | Performed by: NURSE PRACTITIONER

## 2021-10-27 PROCEDURE — G8484 FLU IMMUNIZE NO ADMIN: HCPCS | Performed by: NURSE PRACTITIONER

## 2021-10-27 PROCEDURE — 1036F TOBACCO NON-USER: CPT | Performed by: NURSE PRACTITIONER

## 2021-10-27 PROCEDURE — 3017F COLORECTAL CA SCREEN DOC REV: CPT | Performed by: NURSE PRACTITIONER

## 2021-10-27 PROCEDURE — G8417 CALC BMI ABV UP PARAM F/U: HCPCS | Performed by: NURSE PRACTITIONER

## 2021-10-27 PROCEDURE — G8427 DOCREV CUR MEDS BY ELIG CLIN: HCPCS | Performed by: NURSE PRACTITIONER

## 2021-10-27 NOTE — PROGRESS NOTES
Chief Complaint   Patient presents with    Knee Pain     Bilateral Knee F/U, had Zilretta injection on 2021 with good relief. Jamison Hashimoto returns today for follow-up of her bilateral knee pain. she reports this is better than when I saw her last.  The patient's pain level is a 0/10. The previous treatment was successful.     Past Medical History:   Diagnosis Date    Arthritis     left shoulder    Constipation     Diabetes mellitus ([address]     Headache     History of hernia repair     HX OTHER MEDICAL     hard to wake after anesth    Hypertension     Neck pain     Numbness     Obesities, morbid ([address]     Other abnormal clinical finding dec 2014     hospitalized [facility] pneumonia had ercp blood infection     Pain     Pneumonia 4-5 yrs ago also 2014    Postmenopausal bleeding 2015    Thyroid disease      Past Surgical History:   Procedure Laterality Date    ABDOMEN SURGERY  10/26/2012    hernia    ABDOMINAL WALL SURGERY  2012     Abdominal Wound Dehiscence Repair    APPENDECTOMY      CARPAL TUNNEL RELEASE       SECTION      x2    CHOLECYSTECTOMY      open    COLONOSCOPY      DILATION AND CURETTAGE OF UTERUS  2015    hysteroscopy    ERCP  dec 2014   [address]  2013    exploratory laparotomy, lysis of adhesions (85 mins), repair of ventral incisional hernia with biologic mesh (Dorion); bilateral myofascial release (Cash)    NECK SURGERY       stacie in neck    [place] COLSC FLX W/RMVL OF TUMOR POLYP LESION SNARE TQ  2013    Dr. Rayo [facility]    TONSILLECTOMY      UPPER GASTROINTESTINAL ENDOSCOPY         Current Outpatient Medications:     gabapentin (NEURONTIN) 300 MG capsule, take 2 capsules by mouth three times a day, Disp: , Rfl:     HYDROcodone-acetaminophen (NORCO) 5-325 MG per tablet, Take by mouth., Disp: , Rfl:     oxyCODONE-acetaminophen (PERCOCET) 5-325 MG per tablet, take 1/2 to 1 tablet by mouth twice a day if needed SPARINGLY FOR SEVERE PAIN for 30 DAYS, Disp: , Rfl:     Nutritional Supplements (BOOST GLUCOSE CONTROL) LIQD, DRINK 1 BOTTLE BY MOUTH THREE TIMES A DAY., Disp: , Rfl:     nystatin (MYCOSTATIN) POWD powder, Apply topically 2 times daily, Disp: , Rfl:     diclofenac (FLECTOR) 1.3 % PTCH patch, Place 1 patch onto the skin 2 times daily, Disp: , Rfl:     diclofenac sodium (VOLTAREN) 1 % GEL, Apply topically 2 times daily, Disp: , Rfl:     FreeStyle Lancets MISC, 1 each by Does not apply route daily, Disp: , Rfl:     blood glucose test strips (ASCENSIA AUTODISC VI;ONE TOUCH ULTRA TEST VI) strip, 1 each by In Vitro route daily As needed. , Disp: , Rfl:     metFORMIN (GLUCOPHAGE) 1000 MG tablet, Take 1,000 mg by mouth 2 times daily (with meals), Disp: , Rfl:     Dulaglutide (TRULICITY SC), Inject 0.5 mLs into the skin once a week, Disp: , Rfl:     tiZANidine (ZANAFLEX) 4 MG tablet, Take 4 mg by mouth every 8 hours as needed, Disp: , Rfl:     Topiramate (TOPAMAX PO), Take 100 mg by mouth 2 times daily , Disp: , Rfl:     levothyroxine (SYNTHROID) 75 MCG tablet, Take 75 mcg by mouth Daily , Disp: , Rfl:     metoprolol (TOPROL-XL) 25 MG XL tablet, Take 25 mg by mouth daily , Disp: , Rfl:   Allergies   Allergen Reactions    Latex Rash    Ancef [Cefazolin] Shortness Of Breath     Nausea, itching, shortness of breath, flushing, reddness    Norco [Hydrocodone-Acetaminophen]      Went into shock     Codeine Other (See Comments)     \"makes me catatonic\"     Social History     Socioeconomic History    Marital status:      Spouse name: Not on file    Number of children: Not on file    Years of education: Not on file    Highest education level: Not on file   Occupational History    Not on file   Tobacco Use    Smoking status: Former Smoker     Packs/day: 1.00     Types: Cigarettes     Quit date: 10/7/2012     Years since quittin.0    Smokeless tobacco: Never Used  Tobacco comment: quit    Vaping Use    Vaping Use: Never used   Substance and Sexual Activity    Alcohol use: No     Comment: 1 cup of coffee a day     Drug use: No    Sexual activity: Not on file   Other Topics Concern    Not on file   Social History Narrative    Not on file     Social Determinants of Health     Financial Resource Strain:     Difficulty of Paying Living Expenses:    Food Insecurity:     Worried About Running Out of Food in the Last Year:     [address] in the Last Year:    Transportation Needs:     Lack of Transportation (Medical):  Lack of Transportation (Non-Medical):    Physical Activity:     Days of Exercise per Week:     Minutes of Exercise per Session:    Stress:     Feeling of Stress :    Social Connections:     Frequency of Communication with Friends and Family:     Frequency of Social Gatherings with Friends and Family:     Attends Congregation Services:     Active Member of Clubs or Organizations:     Attends Club or Organization Meetings:     Marital Status:    Intimate Partner Violence:     Fear of Current or Ex-Partner:     Emotionally Abused:     Physically Abused:     Sexually Abused:      Family History   Problem Relation Age of Onset    Cancer Father         bladder and colon    Heart Attack Brother          at 46       Review of Systems:     Skin: (-) rash,(-) psoriasis,(-) eczema, (-)skin cancer. Musculoskeletal: (-) fractures,  (-) dislocations,(-) collagen vascular disease, (-) fibromyalgia, (-) multiple sclerosis, (-) muscular dystrophy, (-) RSD,(-) joint pain (-)swelling, (-) joint pain,swelling. Neurologic: (-) epilepsy, (-)seizures,(-) brain tumor,(-) TIA, (-)stroke, (-)headaches, (-)Parkinson disease,(-) memory loss, (-) LOC. Cardiovascular: (-) Chest pain, (-) swelling in legs/feet, (-) SOB, (-) cramping in legs/feet with walking.     Constitutional:  The patient is alert and oriented x 3, appears to be stated age and in no distress. Temp 98 °F (36.7 °C)   Ht 5' 6\" (1.676 m)   Wt (!) 317 lb (143.8 kg)   LMP 12/20/2014 Comment: irregular  bleeding  BMI 51.17 kg/m²     Skin:  Upon inspection: the skin appears warm, dry and intact. There is not a previous scar over the affected area. There is not any cellulitis, lymphedema or cutaneous lesions noted in the lower extremities. Upon palpation there is no induration noted. Neurologic:  Gait: normal;  Motor exam of the lower extremities show ; quadriceps, hamstrings, foot dorsi and plantar flexors intact R.  5/5 and L. 5/5. Deep tendon reflexes are 2/4 at the knees and 2/4 at the ankles with strong extensor hallicus longus motor strength bilaterally. Sensory to both feet is intact to all sensory roots. Cardiovascular: The vascular exam is normal and is well perfused to distal extremities. Distal pulses DP/PT: R. 2+; L. 2+. There is cap refill noted less than two seconds in all digits. There is not edema of the bilateral lower extremities. There is not varicosities noted in the distal extremities. Lymph:  Upon palpation,  there is no lymphadenopathy noted in bilateral lower extremities. Musculoskeletal:  Gait: antalgic; examination of the nails and digits reveal no cyanosis or clubbing    Lumbar exam:  On visual inspection, there is no deformity of the spine. full range of motion, no tenderness, palpable spasm or pain on motion. Special tests: Straight Leg Raise negative, Vel testnegative. Hip exam:  Upon inspection, there is no deformity noted. Upon palpation there is not tenderness. ROM: is   full and semetrical.   Strength: Hip Flexors 5/5; Hip Abductors 5/5; Hip Adduction 5/5. Knee exam:  Bilateral knee exam shows;  range of motion of R. Knee is 0 to 115, and L. Knee is 5 to 100.  The patient does have  pain on motion, effusion is mild, there is tenderness over the  medial, lateral region, there are not any masses, there is not ligamentous instability, there is not  deformity noted. Knee exam: bilateral positive for moderate crepitations, some mild tenderness laxity is not noted with  stress. There is not a popliteal cyst.     R. Knee:  Lachman's negative, Anterior Drawer negative, Posterior Drawer negative  Serenity's positive, Thallasy  positive,   PF grind test positive, Apprehension test negative, Patellar J sign  negative  L. Knee:  Lachman's negative, Anterior Drawer negative, Posterior Drawer negative  Sereniyt's positive, Thallasy  positive,   PF grind test positive, Apprehension test negative,  Patellar J sign  negative     Xray Exam:  Osteoarthritis which is at worst mild at the right knee and minimal at the   left knee.             Radiographic findings reviewed with patient       Impression:  Encounter Diagnoses   Name Primary?  Primary osteoarthritis of right knee Yes    Primary osteoarthritis of left knee        Plan:   Natural history and expected course discussed. Questions answered. Educational materials distributed. Rest, ice, compression, and elevation (RICE) therapy. Reduction in offending activity. I had a lengthy discussion with the patient regarding their diagnosis. I explained treatment options including surgical vs non surgical treatment. I reviewed in detail the risks and benefits and outlined the procedure in detail with expected outcomes and possible complications. I also discussed non surgical treatment such as injections (CSI and visco supplementation), physical therapy, topical creams and NSAID's. They have elected for cosnervative management at this time.    Fu prn

## 2021-11-08 ENCOUNTER — HOSPITAL ENCOUNTER (OUTPATIENT)
Dept: ULTRASOUND IMAGING | Age: 61
Discharge: HOME | End: 2021-11-08
Payer: COMMERCIAL

## 2021-11-08 ENCOUNTER — HOSPITAL ENCOUNTER (OUTPATIENT)
Dept: CT IMAGING | Age: 61
Discharge: HOME | End: 2021-11-08
Payer: COMMERCIAL

## 2021-11-08 ENCOUNTER — HOSPITAL ENCOUNTER (OUTPATIENT)
Age: 61
Discharge: HOME | End: 2021-11-08
Payer: COMMERCIAL

## 2021-11-08 DIAGNOSIS — R10.11 RUQ ABDOMINAL PAIN: ICD-10-CM

## 2021-11-08 DIAGNOSIS — N95.0 ABNORMAL VAGINAL BLEEDING IN POSTMENOPAUSAL PATIENT: ICD-10-CM

## 2021-11-08 LAB
ALBUMIN SERPL-MCNC: 4.1 G/DL (ref 3.5–5.2)
ALP BLD-CCNC: 92 U/L (ref 35–104)
ALT SERPL-CCNC: 14 U/L (ref 0–32)
ANION GAP SERPL CALCULATED.3IONS-SCNC: 6 MMOL/L (ref 7–16)
AST SERPL-CCNC: 13 U/L (ref 0–31)
BASOPHILS ABSOLUTE: 0.05 E9/L (ref 0–0.2)
BASOPHILS RELATIVE PERCENT: 0.6 % (ref 0–2)
BILIRUB SERPL-MCNC: 0.5 MG/DL (ref 0–1.2)
BUN BLDV-MCNC: 19 MG/DL (ref 6–23)
CALCIUM SERPL-MCNC: 9.6 MG/DL (ref 8.6–10.2)
CHLORIDE BLD-SCNC: 105 MMOL/L (ref 98–107)
CO2: 28 MMOL/L (ref 22–29)
CREAT SERPL-MCNC: 0.8 MG/DL (ref 0.5–1)
EOSINOPHILS ABSOLUTE: 0.23 E9/L (ref 0.05–0.5)
EOSINOPHILS RELATIVE PERCENT: 2.8 % (ref 0–6)
GFR AFRICAN AMERICAN: >60
GFR NON-AFRICAN AMERICAN: >60 ML/MIN/1.73
GLUCOSE BLD-MCNC: 157 MG/DL (ref 74–99)
HCT VFR BLD CALC: 44.1 % (ref 34–48)
HEMOGLOBIN: 13.6 G/DL (ref 11.5–15.5)
IMMATURE GRANULOCYTES #: 0.05 E9/L
IMMATURE GRANULOCYTES %: 0.6 % (ref 0–5)
LYMPHOCYTES ABSOLUTE: 2.57 E9/L (ref 1.5–4)
LYMPHOCYTES RELATIVE PERCENT: 31.3 % (ref 20–42)
MCH RBC QN AUTO: 30.8 PG (ref 26–35)
MCHC RBC AUTO-ENTMCNC: 30.8 % (ref 32–34.5)
MCV RBC AUTO: 100 FL (ref 80–99.9)
MONOCYTES ABSOLUTE: 0.51 E9/L (ref 0.1–0.95)
MONOCYTES RELATIVE PERCENT: 6.2 % (ref 2–12)
NEUTROPHILS ABSOLUTE: 4.8 E9/L (ref 1.8–7.3)
NEUTROPHILS RELATIVE PERCENT: 58.5 % (ref 43–80)
PDW BLD-RTO: 13.7 FL (ref 11.5–15)
PLATELET # BLD: 289 E9/L (ref 130–450)
PMV BLD AUTO: 8.9 FL (ref 7–12)
POTASSIUM SERPL-SCNC: 4.4 MMOL/L (ref 3.5–5)
RBC # BLD: 4.41 E12/L (ref 3.5–5.5)
SODIUM BLD-SCNC: 139 MMOL/L (ref 132–146)
TOTAL PROTEIN: 7.5 G/DL (ref 6.4–8.3)
WBC # BLD: 8.2 E9/L (ref 4.5–11.5)

## 2021-11-08 PROCEDURE — 80053 COMPREHEN METABOLIC PANEL: CPT

## 2021-11-08 PROCEDURE — 74178 CT ABD&PLV WO CNTR FLWD CNTR: CPT

## 2021-11-08 PROCEDURE — 36415 COLL VENOUS BLD VENIPUNCTURE: CPT

## 2021-11-08 PROCEDURE — 6360000004 HC RX CONTRAST MEDICATION: Performed by: RADIOLOGY

## 2021-11-08 PROCEDURE — 85025 COMPLETE CBC W/AUTO DIFF WBC: CPT

## 2021-11-08 PROCEDURE — 76830 TRANSVAGINAL US NON-OB: CPT

## 2021-11-08 RX ADMIN — IOPAMIDOL 75 ML: 755 INJECTION, SOLUTION INTRAVENOUS at 09:19

## 2021-12-09 RX ORDER — SODIUM CHLORIDE 9 MG/ML
INJECTION, SOLUTION INTRAVENOUS CONTINUOUS
Status: CANCELLED | OUTPATIENT
Start: 2021-12-09

## 2021-12-10 ENCOUNTER — HOSPITAL ENCOUNTER (OUTPATIENT)
Dept: PREADMISSION TESTING | Age: 61
Discharge: HOME | End: 2021-12-10
Payer: COMMERCIAL

## 2021-12-10 VITALS
WEIGHT: 293 LBS | SYSTOLIC BLOOD PRESSURE: 140 MMHG | BODY MASS INDEX: 47.09 KG/M2 | HEIGHT: 66 IN | HEART RATE: 73 BPM | TEMPERATURE: 97.3 F | DIASTOLIC BLOOD PRESSURE: 64 MMHG | OXYGEN SATURATION: 96 % | RESPIRATION RATE: 18 BRPM

## 2021-12-10 DIAGNOSIS — Z01.818 PRE-OP TESTING: Primary | ICD-10-CM

## 2021-12-10 LAB
ANION GAP SERPL CALCULATED.3IONS-SCNC: 11 MMOL/L (ref 7–16)
BUN BLDV-MCNC: 15 MG/DL (ref 6–23)
CALCIUM SERPL-MCNC: 8.9 MG/DL (ref 8.6–10.2)
CHLORIDE BLD-SCNC: 106 MMOL/L (ref 98–107)
CO2: 25 MMOL/L (ref 22–29)
CREAT SERPL-MCNC: 0.8 MG/DL (ref 0.5–1)
EKG ATRIAL RATE: 66 BPM
EKG P AXIS: 52 DEGREES
EKG P-R INTERVAL: 164 MS
EKG Q-T INTERVAL: 408 MS
EKG QRS DURATION: 104 MS
EKG QTC CALCULATION (BAZETT): 427 MS
EKG R AXIS: 68 DEGREES
EKG T AXIS: 29 DEGREES
EKG VENTRICULAR RATE: 66 BPM
GFR AFRICAN AMERICAN: >60
GFR NON-AFRICAN AMERICAN: >60 ML/MIN/1.73
GLUCOSE BLD-MCNC: 140 MG/DL (ref 74–99)
HCT VFR BLD CALC: 41.9 % (ref 34–48)
HEMOGLOBIN: 13 G/DL (ref 11.5–15.5)
MCH RBC QN AUTO: 31.2 PG (ref 26–35)
MCHC RBC AUTO-ENTMCNC: 31 % (ref 32–34.5)
MCV RBC AUTO: 100.5 FL (ref 80–99.9)
PDW BLD-RTO: 13.7 FL (ref 11.5–15)
PLATELET # BLD: 263 E9/L (ref 130–450)
PMV BLD AUTO: 9 FL (ref 7–12)
POTASSIUM REFLEX MAGNESIUM: 4.6 MMOL/L (ref 3.5–5)
RBC # BLD: 4.17 E12/L (ref 3.5–5.5)
SODIUM BLD-SCNC: 142 MMOL/L (ref 132–146)
WBC # BLD: 6.6 E9/L (ref 4.5–11.5)

## 2021-12-10 PROCEDURE — 36415 COLL VENOUS BLD VENIPUNCTURE: CPT

## 2021-12-10 PROCEDURE — 93005 ELECTROCARDIOGRAM TRACING: CPT | Performed by: ANESTHESIOLOGY

## 2021-12-10 PROCEDURE — 85027 COMPLETE CBC AUTOMATED: CPT

## 2021-12-10 PROCEDURE — 80048 BASIC METABOLIC PNL TOTAL CA: CPT

## 2021-12-10 RX ORDER — SPIRONOLACTONE 25 MG/1
25 TABLET ORAL DAILY
COMMUNITY

## 2021-12-10 ASSESSMENT — PAIN DESCRIPTION - ONSET: ONSET: ON-GOING

## 2021-12-10 ASSESSMENT — PAIN DESCRIPTION - FREQUENCY: FREQUENCY: INTERMITTENT

## 2021-12-10 ASSESSMENT — PAIN DESCRIPTION - ORIENTATION: ORIENTATION: LOWER

## 2021-12-10 ASSESSMENT — PAIN SCALES - GENERAL: PAINLEVEL_OUTOF10: 7

## 2021-12-10 ASSESSMENT — PAIN - FUNCTIONAL ASSESSMENT: PAIN_FUNCTIONAL_ASSESSMENT: PREVENTS OR INTERFERES WITH MANY ACTIVE NOT PASSIVE ACTIVITIES

## 2021-12-10 ASSESSMENT — PAIN DESCRIPTION - DIRECTION: RADIATING_TOWARDS: BIL LEGS

## 2021-12-10 ASSESSMENT — PAIN DESCRIPTION - PAIN TYPE: TYPE: CHRONIC PAIN

## 2021-12-10 ASSESSMENT — PAIN DESCRIPTION - PROGRESSION: CLINICAL_PROGRESSION: NOT CHANGED

## 2021-12-10 ASSESSMENT — PAIN DESCRIPTION - LOCATION: LOCATION: BACK

## 2021-12-10 ASSESSMENT — PAIN DESCRIPTION - DESCRIPTORS: DESCRIPTORS: ACHING;THROBBING;TENDER

## 2021-12-10 NOTE — PROGRESS NOTES
[facility]                                                                                                                    PRE OP INSTRUCTIONS FOR  Say Handler        Date: 12/10/2021    Date of surgery: 12/16/21   Arrival Time: Hospital will call you between 5pm and 7pm with your final arrival time for surgery    1. Do not eat or drink anything after midnight prior to surgery. This includes no water, chewing gum, mints or ice chips. 2. Take the following medications with a small sip of water on the morning of Surgery: metoprolol,gabapentin, topamate synthroid    3. Diabetics may take evening dose of insulin but none after midnight. If you feel symptomatic or low blood sugar morning of surgery drink 1-2 ounces of apple juice only. 4. Aspirin, Ibuprofen, Advil, Naproxen, Vitamin E and other Anti-inflammatory products should be stopped  before surgery  as directed by your physician. Take Tylenol only unless instructed otherwise by your surgeon. 5. Do not smoke,use illicit drugs and do not drink any alcoholic beverages 24 hours prior to surgery. 6. You may brush your teeth the morning of surgery. DO NOT SWALLOW WATER    7. You MUST make arrangements for a responsible adult to take you home after your surgery. You will not be allowed to leave alone or drive yourself home. It is strongly suggested someone stay with you the first 24 hrs. Your surgery will be cancelled if you do not have a ride home. 8. Please wear simple, loose fitting clothing to the hospital.  Cj Flowers not bring valuables (money, credit cards, checkbooks, etc.) Do not wear any makeup (including no eye makeup) or nail polish on your fingers or toes. 9. DO NOT wear any jewelry or piercings on day of surgery. All body piercing jewelry must be removed. 10. Shower the night before surgery with _x__Antibacterial soap /JESÚS WIPES________        11.  Notify your Surgeon if you develop any illness between now and surgery time, cough, cold, fever, sore throat, nausea, vomiting, etc.  Please notify your surgeon if you experience dizziness, shortness of breath or blurred vision between now & the time of your surgery. 12. If you have ___dentures, they will be removed before going to the OR; we will provide you a container. If you wear ___contact lenses or ___glasses, they will be removed; please bring a case for them. 13. To provide excellent care visitors will be limited to 1  in the room at any given time. 14. Bring Covid card. 15. During flu season no children under the age of 15 are permitted in the hospital for the safety of all patients. 16. Other come in main lobby and stop at                  Please call AMBULATORY CARE if you have any further questions.    [address]     [address]

## 2021-12-10 NOTE — PROGRESS NOTES
Spoke with Dr Nan Weir re: pt 's history of deviated trachea, needed anesthesia to assist with ERCP.   Lex May RN  12/10/2021  2:24 PM

## 2021-12-16 ENCOUNTER — HOSPITAL ENCOUNTER (OUTPATIENT)
Age: 61
Setting detail: OUTPATIENT SURGERY
Discharge: HOME | End: 2021-12-16
Attending: OBSTETRICS & GYNECOLOGY | Admitting: OBSTETRICS & GYNECOLOGY
Payer: COMMERCIAL

## 2021-12-16 ENCOUNTER — ANESTHESIA (OUTPATIENT)
Dept: OPERATING ROOM | Age: 61
End: 2021-12-16
Payer: COMMERCIAL

## 2021-12-16 ENCOUNTER — ANESTHESIA EVENT (OUTPATIENT)
Dept: OPERATING ROOM | Age: 61
End: 2021-12-16
Payer: COMMERCIAL

## 2021-12-16 VITALS
HEART RATE: 81 BPM | DIASTOLIC BLOOD PRESSURE: 60 MMHG | SYSTOLIC BLOOD PRESSURE: 135 MMHG | RESPIRATION RATE: 16 BRPM | TEMPERATURE: 97.1 F | OXYGEN SATURATION: 92 %

## 2021-12-16 VITALS
TEMPERATURE: 58.3 F | SYSTOLIC BLOOD PRESSURE: 107 MMHG | DIASTOLIC BLOOD PRESSURE: 72 MMHG | RESPIRATION RATE: 3 BRPM | OXYGEN SATURATION: 99 %

## 2021-12-16 LAB — METER GLUCOSE: 129 MG/DL (ref 74–99)

## 2021-12-16 PROCEDURE — 88305 TISSUE EXAM BY PATHOLOGIST: CPT

## 2021-12-16 PROCEDURE — 3700000001 HC ADD 15 MINUTES (ANESTHESIA): Performed by: OBSTETRICS & GYNECOLOGY

## 2021-12-16 PROCEDURE — 2709999900 HC NON-CHARGEABLE SUPPLY: Performed by: OBSTETRICS & GYNECOLOGY

## 2021-12-16 PROCEDURE — 82962 GLUCOSE BLOOD TEST: CPT

## 2021-12-16 PROCEDURE — 2580000003 HC RX 258: Performed by: ANESTHESIOLOGY

## 2021-12-16 PROCEDURE — 3600000003 HC SURGERY LEVEL 3 BASE: Performed by: OBSTETRICS & GYNECOLOGY

## 2021-12-16 PROCEDURE — 7100000011 HC PHASE II RECOVERY - ADDTL 15 MIN: Performed by: OBSTETRICS & GYNECOLOGY

## 2021-12-16 PROCEDURE — 7100000001 HC PACU RECOVERY - ADDTL 15 MIN: Performed by: OBSTETRICS & GYNECOLOGY

## 2021-12-16 PROCEDURE — 6360000002 HC RX W HCPCS: Performed by: NURSE ANESTHETIST, CERTIFIED REGISTERED

## 2021-12-16 PROCEDURE — 3700000000 HC ANESTHESIA ATTENDED CARE: Performed by: OBSTETRICS & GYNECOLOGY

## 2021-12-16 PROCEDURE — 2500000003 HC RX 250 WO HCPCS: Performed by: NURSE ANESTHETIST, CERTIFIED REGISTERED

## 2021-12-16 PROCEDURE — 7100000010 HC PHASE II RECOVERY - FIRST 15 MIN: Performed by: OBSTETRICS & GYNECOLOGY

## 2021-12-16 PROCEDURE — 3600000013 HC SURGERY LEVEL 3 ADDTL 15MIN: Performed by: OBSTETRICS & GYNECOLOGY

## 2021-12-16 PROCEDURE — 7100000000 HC PACU RECOVERY - FIRST 15 MIN: Performed by: OBSTETRICS & GYNECOLOGY

## 2021-12-16 RX ORDER — FENTANYL CITRATE 50 UG/ML
INJECTION, SOLUTION INTRAMUSCULAR; INTRAVENOUS PRN
Status: DISCONTINUED | OUTPATIENT
Start: 2021-12-16 | End: 2021-12-16 | Stop reason: SDUPTHER

## 2021-12-16 RX ORDER — SODIUM CHLORIDE 9 MG/ML
25 INJECTION, SOLUTION INTRAVENOUS PRN
Status: DISCONTINUED | OUTPATIENT
Start: 2021-12-16 | End: 2021-12-16 | Stop reason: HOSPADM

## 2021-12-16 RX ORDER — DEXAMETHASONE SODIUM PHOSPHATE 10 MG/ML
INJECTION, SOLUTION INTRAMUSCULAR; INTRAVENOUS PRN
Status: DISCONTINUED | OUTPATIENT
Start: 2021-12-16 | End: 2021-12-16 | Stop reason: SDUPTHER

## 2021-12-16 RX ORDER — SODIUM CHLORIDE 0.9 % (FLUSH) 0.9 %
5-40 SYRINGE (ML) INJECTION EVERY 12 HOURS SCHEDULED
Status: CANCELLED | OUTPATIENT
Start: 2021-12-16

## 2021-12-16 RX ORDER — IBUPROFEN 600 MG/1
600 TABLET ORAL ONCE
Status: DISCONTINUED | OUTPATIENT
Start: 2021-12-16 | End: 2021-12-16 | Stop reason: HOSPADM

## 2021-12-16 RX ORDER — SODIUM CHLORIDE, SODIUM LACTATE, POTASSIUM CHLORIDE, CALCIUM CHLORIDE 600; 310; 30; 20 MG/100ML; MG/100ML; MG/100ML; MG/100ML
INJECTION, SOLUTION INTRAVENOUS CONTINUOUS
Status: CANCELLED | OUTPATIENT
Start: 2021-12-16

## 2021-12-16 RX ORDER — PROPOFOL 10 MG/ML
INJECTION, EMULSION INTRAVENOUS PRN
Status: DISCONTINUED | OUTPATIENT
Start: 2021-12-16 | End: 2021-12-16 | Stop reason: SDUPTHER

## 2021-12-16 RX ORDER — SODIUM CHLORIDE 0.9 % (FLUSH) 0.9 %
5-40 SYRINGE (ML) INJECTION PRN
Status: CANCELLED | OUTPATIENT
Start: 2021-12-16

## 2021-12-16 RX ORDER — SODIUM CHLORIDE, SODIUM LACTATE, POTASSIUM CHLORIDE, CALCIUM CHLORIDE 600; 310; 30; 20 MG/100ML; MG/100ML; MG/100ML; MG/100ML
INJECTION, SOLUTION INTRAVENOUS CONTINUOUS
Status: DISCONTINUED | OUTPATIENT
Start: 2021-12-16 | End: 2021-12-16 | Stop reason: HOSPADM

## 2021-12-16 RX ORDER — ONDANSETRON 2 MG/ML
INJECTION INTRAMUSCULAR; INTRAVENOUS PRN
Status: DISCONTINUED | OUTPATIENT
Start: 2021-12-16 | End: 2021-12-16 | Stop reason: SDUPTHER

## 2021-12-16 RX ORDER — SODIUM CHLORIDE 9 MG/ML
INJECTION, SOLUTION INTRAVENOUS CONTINUOUS
Status: DISCONTINUED | OUTPATIENT
Start: 2021-12-16 | End: 2021-12-16 | Stop reason: HOSPADM

## 2021-12-16 RX ORDER — MIDAZOLAM HYDROCHLORIDE 1 MG/ML
INJECTION INTRAMUSCULAR; INTRAVENOUS PRN
Status: DISCONTINUED | OUTPATIENT
Start: 2021-12-16 | End: 2021-12-16 | Stop reason: SDUPTHER

## 2021-12-16 RX ORDER — SUCCINYLCHOLINE/SOD CL,ISO/PF 200MG/10ML
SYRINGE (ML) INTRAVENOUS PRN
Status: DISCONTINUED | OUTPATIENT
Start: 2021-12-16 | End: 2021-12-16 | Stop reason: SDUPTHER

## 2021-12-16 RX ORDER — FENTANYL CITRATE 50 UG/ML
25 INJECTION, SOLUTION INTRAMUSCULAR; INTRAVENOUS EVERY 5 MIN PRN
Status: DISCONTINUED | OUTPATIENT
Start: 2021-12-16 | End: 2021-12-16 | Stop reason: HOSPADM

## 2021-12-16 RX ORDER — SODIUM CHLORIDE 0.9 % (FLUSH) 0.9 %
5-40 SYRINGE (ML) INJECTION PRN
Status: DISCONTINUED | OUTPATIENT
Start: 2021-12-16 | End: 2021-12-16 | Stop reason: HOSPADM

## 2021-12-16 RX ORDER — SODIUM CHLORIDE 9 MG/ML
25 INJECTION, SOLUTION INTRAVENOUS PRN
Status: CANCELLED | OUTPATIENT
Start: 2021-12-16

## 2021-12-16 RX ORDER — IBUPROFEN 800 MG/1
800 TABLET ORAL EVERY 8 HOURS PRN
Status: CANCELLED | OUTPATIENT
Start: 2021-12-16

## 2021-12-16 RX ORDER — ONDANSETRON 2 MG/ML
4 INJECTION INTRAMUSCULAR; INTRAVENOUS EVERY 6 HOURS PRN
Status: CANCELLED | OUTPATIENT
Start: 2021-12-16

## 2021-12-16 RX ORDER — OXYCODONE HYDROCHLORIDE AND ACETAMINOPHEN 5; 325 MG/1; MG/1
1 TABLET ORAL EVERY 4 HOURS PRN
Status: CANCELLED | OUTPATIENT
Start: 2021-12-16

## 2021-12-16 RX ORDER — LIDOCAINE HYDROCHLORIDE 20 MG/ML
INJECTION, SOLUTION INTRAVENOUS PRN
Status: DISCONTINUED | OUTPATIENT
Start: 2021-12-16 | End: 2021-12-16 | Stop reason: SDUPTHER

## 2021-12-16 RX ORDER — OXYCODONE HYDROCHLORIDE AND ACETAMINOPHEN 5; 325 MG/1; MG/1
2 TABLET ORAL EVERY 4 HOURS PRN
Status: CANCELLED | OUTPATIENT
Start: 2021-12-16

## 2021-12-16 RX ORDER — PROCHLORPERAZINE EDISYLATE 5 MG/ML
5 INJECTION INTRAMUSCULAR; INTRAVENOUS
Status: DISCONTINUED | OUTPATIENT
Start: 2021-12-16 | End: 2021-12-16 | Stop reason: HOSPADM

## 2021-12-16 RX ORDER — ONDANSETRON 4 MG/1
4 TABLET, ORALLY DISINTEGRATING ORAL EVERY 8 HOURS PRN
Status: CANCELLED | OUTPATIENT
Start: 2021-12-16

## 2021-12-16 RX ORDER — DOXYCYCLINE 100 MG/1
100 CAPSULE ORAL 2 TIMES DAILY
Qty: 20 CAPSULE | Refills: 0 | Status: SHIPPED | OUTPATIENT
Start: 2021-12-16 | End: 2021-12-26

## 2021-12-16 RX ORDER — IBUPROFEN 600 MG/1
600 TABLET ORAL ONCE
Qty: 21 TABLET | Refills: 0 | Status: SHIPPED | OUTPATIENT
Start: 2021-12-16 | End: 2022-06-20

## 2021-12-16 RX ORDER — MEPERIDINE HYDROCHLORIDE 25 MG/ML
12.5 INJECTION INTRAMUSCULAR; INTRAVENOUS; SUBCUTANEOUS EVERY 5 MIN PRN
Status: DISCONTINUED | OUTPATIENT
Start: 2021-12-16 | End: 2021-12-16 | Stop reason: HOSPADM

## 2021-12-16 RX ORDER — SODIUM CHLORIDE 0.9 % (FLUSH) 0.9 %
5-40 SYRINGE (ML) INJECTION EVERY 12 HOURS SCHEDULED
Status: DISCONTINUED | OUTPATIENT
Start: 2021-12-16 | End: 2021-12-16 | Stop reason: HOSPADM

## 2021-12-16 RX ADMIN — SODIUM CHLORIDE: 9 INJECTION, SOLUTION INTRAVENOUS at 09:40

## 2021-12-16 RX ADMIN — DEXAMETHASONE SODIUM PHOSPHATE 10 MG: 10 INJECTION, SOLUTION INTRAMUSCULAR; INTRAVENOUS at 08:50

## 2021-12-16 RX ADMIN — FENTANYL CITRATE 50 MCG: 50 INJECTION, SOLUTION INTRAMUSCULAR; INTRAVENOUS at 08:43

## 2021-12-16 RX ADMIN — PHENYLEPHRINE HYDROCHLORIDE 200 MCG: 10 INJECTION INTRAVENOUS at 09:17

## 2021-12-16 RX ADMIN — LIDOCAINE HYDROCHLORIDE 100 MG: 20 INJECTION, SOLUTION INTRAVENOUS at 08:43

## 2021-12-16 RX ADMIN — ONDANSETRON 4 MG: 2 INJECTION INTRAMUSCULAR; INTRAVENOUS at 08:50

## 2021-12-16 RX ADMIN — PHENYLEPHRINE HYDROCHLORIDE 200 MCG: 10 INJECTION INTRAVENOUS at 09:12

## 2021-12-16 RX ADMIN — PHENYLEPHRINE HYDROCHLORIDE 100 MCG: 10 INJECTION INTRAVENOUS at 09:04

## 2021-12-16 RX ADMIN — PHENYLEPHRINE HYDROCHLORIDE 100 MCG: 10 INJECTION INTRAVENOUS at 09:03

## 2021-12-16 RX ADMIN — PHENYLEPHRINE HYDROCHLORIDE 200 MCG: 10 INJECTION INTRAVENOUS at 09:07

## 2021-12-16 RX ADMIN — SODIUM CHLORIDE: 9 INJECTION, SOLUTION INTRAVENOUS at 07:49

## 2021-12-16 RX ADMIN — MIDAZOLAM 2 MG: 1 INJECTION INTRAMUSCULAR; INTRAVENOUS at 08:30

## 2021-12-16 RX ADMIN — PROPOFOL 200 MG: 10 INJECTION, EMULSION INTRAVENOUS at 08:43

## 2021-12-16 RX ADMIN — Medication 140 MG: at 08:43

## 2021-12-16 RX ADMIN — PHENYLEPHRINE HYDROCHLORIDE 100 MCG: 10 INJECTION INTRAVENOUS at 08:59

## 2021-12-16 ASSESSMENT — PULMONARY FUNCTION TESTS
PIF_VALUE: 30
PIF_VALUE: 3
PIF_VALUE: 2
PIF_VALUE: 30
PIF_VALUE: 25
PIF_VALUE: 30
PIF_VALUE: 38
PIF_VALUE: 38
PIF_VALUE: 1
PIF_VALUE: 12
PIF_VALUE: 30
PIF_VALUE: 25
PIF_VALUE: 11
PIF_VALUE: 25
PIF_VALUE: 1
PIF_VALUE: 27
PIF_VALUE: 25
PIF_VALUE: 22
PIF_VALUE: 4
PIF_VALUE: 24
PIF_VALUE: 27
PIF_VALUE: 0
PIF_VALUE: 30
PIF_VALUE: 0
PIF_VALUE: 4
PIF_VALUE: 25
PIF_VALUE: 30
PIF_VALUE: 24
PIF_VALUE: 25
PIF_VALUE: 25
PIF_VALUE: 30
PIF_VALUE: 27
PIF_VALUE: 25
PIF_VALUE: 27
PIF_VALUE: 30
PIF_VALUE: 8
PIF_VALUE: 12
PIF_VALUE: 1
PIF_VALUE: 5
PIF_VALUE: 25
PIF_VALUE: 3
PIF_VALUE: 25
PIF_VALUE: 31
PIF_VALUE: 30
PIF_VALUE: 30
PIF_VALUE: 16
PIF_VALUE: 11
PIF_VALUE: 30
PIF_VALUE: 25
PIF_VALUE: 16
PIF_VALUE: 25
PIF_VALUE: 30
PIF_VALUE: 25
PIF_VALUE: 30
PIF_VALUE: 23
PIF_VALUE: 25
PIF_VALUE: 25
PIF_VALUE: 27
PIF_VALUE: 30
PIF_VALUE: 27
PIF_VALUE: 27
PIF_VALUE: 30
PIF_VALUE: 25
PIF_VALUE: 3
PIF_VALUE: 18
PIF_VALUE: 30
PIF_VALUE: 28
PIF_VALUE: 25
PIF_VALUE: 24
PIF_VALUE: 2
PIF_VALUE: 3
PIF_VALUE: 25
PIF_VALUE: 25

## 2021-12-16 ASSESSMENT — PAIN DESCRIPTION - ORIENTATION: ORIENTATION: LOWER

## 2021-12-16 ASSESSMENT — PAIN SCALES - GENERAL
PAINLEVEL_OUTOF10: 0
PAINLEVEL_OUTOF10: 3
PAINLEVEL_OUTOF10: 0
PAINLEVEL_OUTOF10: 0

## 2021-12-16 ASSESSMENT — PAIN DESCRIPTION - DESCRIPTORS
DESCRIPTORS: ACHING
DESCRIPTORS: CRAMPING

## 2021-12-16 ASSESSMENT — PAIN DESCRIPTION - PAIN TYPE: TYPE: SURGICAL PAIN

## 2021-12-16 ASSESSMENT — PAIN DESCRIPTION - LOCATION: LOCATION: VAGINA

## 2021-12-16 ASSESSMENT — PAIN - FUNCTIONAL ASSESSMENT: PAIN_FUNCTIONAL_ASSESSMENT: 0-10

## 2021-12-16 NOTE — OP NOTE
Operative Note      Patient: Danica Ferrer  YOB: 1960  MRN: [id number]    Date of Procedure: 12/16/2021      Specimens:   ID Type Source Tests Collected by Time Destination   A : UTERINE CURETTINGS  Tissue Tissue SURGICAL PATHOLOGY Gurdeep Goldberg MD 12/16/2021 [id number]            Detailed Description of Procedure:   OPERATIVE NOTE    [place] DATE OF PROCEDURE:     12/16/2021  [id number]   SURGEON:                                             Gurdeep Goldberg MD M.D.  [place]:   PREOPERATIVE DIAGNOSIS: Postmenopausal bleeding, high body mass index, history of diabetes, history of hypertension and enlarged uterus             POSTOPERATIVE DIAGNOSIS:       Same. Rule out endometrial CA    OPERATION:                                         Hysteroscopy and D&C  . ANESTHESIA:                                        General.     ESTIMATED BLOOD LOSS:              less than 50ml     INPUT:            400ml    OUTPUT: 123PA    COMPLICATIONS:                                 None. PROCEDURE NOTE: With the patient in the supine position, general anesthesia was administered without any complications. The patient was then placed in dorsal lithotomy position using cane stirrups. The vulva,vagina and perineum was scrubbed and draped in the usual fashion. The bladder was catheterized, and clear looking urine was recovered. Bimanual exam done and uterusanteverted and adnexa checked bilaterally. MELENDEZ retractor was placed in the vagina. It was very difficult to retract anterior wall of the vagina and expose the anterior lip of cervix with extra help that was accomplished the anterior lip of the cervix was grabbed by single-tooth tenaculum. Uterus is sounded to11 The cervical os was dilated to allow the position of #16 Morales/20 Ether Shackleton without any difficulty.  A 5.5/7 mm diagnostic hysteroscope was introduced into the uterine cavity, and using normal saline as distending medium, diagnostic hysteroscopy was carried out. Both tubal ostia seen polypoid lining noticed inside the uterus. The hysteroscope was then withdrawn. . Next, sharp curettage of the endometrium was performed, and all tissues were submitted to pathology. Inspection revealed excellent hemostasis. The procedure was then terminated. All instruments were removed. Betadine placed on cervix. The patient was placed in supine position, awakened from anesthesia and transferred to recovery room in  stable condition.      Signed:   Dr Jackelyn Sheffield M.D.  12/16/2021  9:37 AM            Electronically signed by Jackelyn Sheffield MD on 12/16/2021 at 9:36 AM

## 2021-12-16 NOTE — PROGRESS NOTES
CLINICAL PHARMACY NOTE: MEDS TO BEDS    Total # of Prescriptions Filled: 1   The following medications were delivered to the patient:  · Doxycycline Mono 100mg    Additional Documentation:    Ibuprofen was not filled for the patient as we were unable to get in touch with Dr. Praveen Langley regarding the directions for the medication. Patient is aware and will take to her outside pharmacy,where they will get in touch with the doctor.

## 2021-12-16 NOTE — H&P
Department of Gynecology  Attending Pre-operative History and Physical      Karena Brayan DIANE [facility]  12/15/2021  [de-identified]        CHIEF COMPLAINT:   Vaginal bleeding    History obtained from patient    HISTORY OF PRESENT ILLNESS:                      57 y.o. female with   history of postmenopausal vaginal bleeding who presents with with bleeding since month of October off and on and now scheduled for hysteroscopy D&C The patient had thorough counselling about the procedure, the advantages and disadvantages complications consequences short-term-long-term.,all the questions were answered to her satisfaction.  Pt desires to proceed with surgery as scheduled      Past Medical History:        Diagnosis Date    Arthritis     left shoulder    Constipation     Diabetes mellitus ([address]     Difficult intubation     deviated Trachea    Headache     History of hernia repair     HX OTHER MEDICAL     hard to wake after anesth    Hypertension     Neck pain     Numbness     Obesities, morbid ([address]     Other abnormal clinical finding dec 2014     hospitalized [facility] pneumonia had ercp blood infection     Pain     Pneumonia 4-5 yrs ago also 2014    Postmenopausal bleeding 2015    Prolonged emergence from general anesthesia     Thyroid disease        Past Surgical History:        Procedure Laterality Date    ABDOMEN SURGERY  10/26/2012    hernia    ABDOMINAL WALL SURGERY  2012     Abdominal Wound Dehiscence Repair    APPENDECTOMY      CARPAL TUNNEL RELEASE       SECTION      x2    CHOLECYSTECTOMY      open    COLONOSCOPY      DILATION AND CURETTAGE OF UTERUS  2015    hysteroscopy    ERCP  dec 2014   [address]  2013    exploratory laparotomy, lysis of adhesions (85 mins), repair of ventral incisional hernia with biologic mesh (Dorion); bilateral myofascial release (Cash)    NECK SURGERY       stacie in neck    [place] COLSC FLX W/RMVL OF TUMOR POLYP LESION SNARE TQ  2013    Dr. Oliverio Ordonez st e s    TONSILLECTOMY      UPPER GASTROINTESTINAL ENDOSCOPY         Past Gynecological History:    1. Last menstrual period: last wk  2. Menses:   3. Pap History: normal Date: 21                    OB History   No obstetric history on file. 3 FTDels  Medications Prior to Admission:   No medications prior to admission. Allergies:  Latex, Ancef [cefazolin], and Codeine     Social History:  Social History     Socioeconomic History    Marital status:      Spouse name: Not on file    Number of children: Not on file    Years of education: Not on file    Highest education level: Not on file   Occupational History    Not on file   Tobacco Use    Smoking status: Former Smoker     Packs/day: 1.00     Types: Cigarettes     Quit date: 10/7/2012     Years since quittin.1    Smokeless tobacco: Never Used    Tobacco comment: quit    Vaping Use    Vaping Use: Never used   Substance and Sexual Activity    Alcohol use: No     Comment: 1 cup of coffee a day     Drug use: No    Sexual activity: Not on file   Other Topics Concern    Not on file   Social History Narrative    Not on file     Social Determinants of Health     Financial Resource Strain:     Difficulty of Paying Living Expenses: Not on file   Food Insecurity:     Worried About [address] in the Last Year: Not on file    Marnie of Food in the Last Year: Not on file   Transportation Needs:     Lack of Transportation (Medical): Not on file    Lack of Transportation (Non-Medical):  Not on file   Physical Activity:     Days of Exercise per Week: Not on file    Minutes of Exercise per Session: Not on file   Stress:     Feeling of Stress : Not on file   Social Connections:     Frequency of Communication with Friends and Family: Not on file    Frequency of Social Gatherings with Friends and Family: Not on file    Attends Mandaen Services: Not on file  Active Member of Clubs or Organizations: Not on file    Attends Club or Organization Meetings: Not on file    Marital Status: Not on file   Intimate Partner Violence:     Fear of Current or Ex-Partner: Not on file    Emotionally Abused: Not on file    Physically Abused: Not on file    Sexually Abused: Not on file   Housing Stability:     Unable to Pay for Housing in the Last Year: Not on file    Number of [place] in the Last Year: Not on file    Unstable Housing in the Last Year: Not on file       Family History:       Problem Relation Age of Onset    Cancer Father         bladder and colon    Heart Attack Brother          at 46       REVIEW OF SYSTEMS:      Constitutional:  negative  Eyes:  negative  Ears, nose, mouth, throat, and face:  negative  Respiratory:  negative  Cardiovascular:  negative  Gastrointestinal:  negative  Genitourinary:  negative  Integument/breast:  negative  Hematologic/lymphatic:  negative  Allergic/Immunologic:  negative  Endocrine:  negative  Musculoskeletal:  negative  Neurological:  negative  Behavior/Psych:  negative    PHYSICAL EXAM:    Vitals:98.1      89     20     144/80      320 lb    5'4\"  LMP 2014 Comment: irregular  bleeding    Eyes:  normal    Head/ENT:  normal    Neck:  normal    Heart:  normal    Breast: normal    Lungs:  normal    Abdomen:  normal    Pelvic: External Genitalia: General appearance; normal, Hair distribution; normal, Lesions absent  Vagina: General appearance normal, Estrogen effect normal, Discharge absent, Lesions absent, Pelvic support normal  Cervix: General appearance normal, Lesions absent, Discharge absent, Tenderness absent, Enlargement absent, Nodularity absent  Uterus:  6-8 wk  [place]:  Masses absent, Tenderness absent    Extremities:  normal    CNS: normal    DATA:  Labs:  CBC:   Lab Results   Component Value Date    WBC 6.6 12/10/2021    RBC 4.17 12/10/2021    HGB 13.0 12/10/2021    HCT 41.9 12/10/2021    .5 12/10/2021    RDW 13.7 12/10/2021     12/10/2021     CMP:    Lab Results   Component Value Date     12/10/2021    K 4.6 12/10/2021     12/10/2021    CO2 25 12/10/2021    BUN 15 12/10/2021    PROT 7.5 11/08/2021     U/A:  No components found for: Fausto Rasheed, USPGRAV, UPH, UPROTEIN, UGLUCOSE, UKETONE, UBILI, UBLOOD, UNITRITE, UUROBIL, [place], [facility], [place], [facility], [facility], [facility], [place]  TSH:    Lab Results   Component Value Date    TSH 5.130 03/01/2017     RPR:  No results found for: RPR  HIV:  No results found for: HIV  HgBA1c:  No components found for: HGBA1C  Blood Type:  No results found for: ABOINT  RH:    Lab Results   Component Value Date    C3 157 11/09/2012    C4 44 11/09/2012     Antibody Screen:  No components found for: ABSCINT  Gonorrhea:  No components found for: PRBCHLGC  Chlamydia:  No components found for: CCHLAM    No orders to display       ASSESSMENT AND PLAN:  Postmenopausal bleeding, obesity, diabetes, hypertension, hypothyroidism    Hysteroscopy D&C    Active Problems:    * No active hospital problems. *  Resolved Problems:    * No resolved hospital problems. *      .  Procedure options, risks and benefits reviewed with patient. paqtient expresses understanding. Patient desires to proceed with the surgery understanding the short-term and long-term consequences and complications and side effects.         Electronically signed by Heather Luciano MD on 12/15/2021 at 9:57 PM

## 2021-12-16 NOTE — ANESTHESIA POSTPROCEDURE EVALUATION
Department of Anesthesiology  Postprocedure Note    Patient: Mini Perez  MRN: [de-identified]  YOB: 1960  Date of evaluation: 12/16/2021  Time:  11:01 AM     Procedure Summary     Date: 12/16/21 Room / Location: [address] [place] [address] 06 / [address] [facility]    Anesthesia Start: 0830 Anesthesia Stop: 4175    Procedure: DILATATION AND CURETTAGE HYSTEROSCOPY (N/A ) Diagnosis: (POST MENOPAUSAL BLEEDING)    Surgeons: Kathryn Kenny MD Responsible Provider: Eben Grossman DO    Anesthesia Type: general ASA Status: 3          Anesthesia Type: general    Juan Carlos Phase I: Juan Carlos Score: 10    Juan Carlos Phase II:      Last vitals: Reviewed and per EMR flowsheets.        Anesthesia Post Evaluation    Patient location during evaluation: PACU  Patient participation: complete - patient participated  Level of consciousness: awake and alert  Airway patency: patent  Nausea & Vomiting: no nausea and no vomiting  Complications: no  Cardiovascular status: hemodynamically stable  Respiratory status: acceptable  Hydration status: stable

## 2021-12-16 NOTE — ANESTHESIA PRE PROCEDURE
Department of Anesthesiology  Preprocedure Note       Name:  Mars Pelaez   Age:  57 y.o.  :  1960                                          MRN:  [id number]         Date:  2021      Surgeon: Tucker Azevedo):  Hermelinda June MD    Procedure: Procedure(s):  DILATATION AND CURETTAGE HYSTEROSCOPY    Medications prior to admission:   Prior to Admission medications    Medication Sig Start Date End Date Taking? Authorizing Provider   gabapentin (NEURONTIN) 300 MG capsule take 2 capsules by mouth three times a day 21  Yes Historical Provider, MD   Topiramate (TOPAMAX PO) Take 100 mg by mouth 2 times daily    Yes Historical Provider, MD   metoprolol (TOPROL-XL) 25 MG XL tablet Take 25 mg by mouth daily    Yes Historical Provider, MD   spironolactone (ALDACTONE) 25 MG tablet Take 25 mg by mouth daily    Historical Provider, MD   oxyCODONE-acetaminophen (PERCOCET) 5-325 MG per tablet take 1/2 to 1 tablet by mouth twice a day if needed SPARINGLY FOR SEVERE PAIN for 30 DAYS 21   Historical Provider, MD   nystatin (MYCOSTATIN) POWD powder Apply topically 2 times daily    Historical Provider, MD   diclofenac (FLECTOR) 1.3 % PTCH patch Place 1 patch onto the skin 2 times daily    Historical Provider, MD   diclofenac sodium (VOLTAREN) 1 % GEL Apply topically 2 times daily    Historical Provider, MD   FreeStyle Lancets MISC 1 each by Does not apply route daily    Historical Provider, MD   blood glucose test strips (ASCENSIA AUTODISC VI;ONE TOUCH ULTRA TEST VI) strip 1 each by In Vitro route daily As needed.     Historical Provider, MD   metFORMIN (GLUCOPHAGE) 1000 MG tablet Take 1,000 mg by mouth 2 times daily (with meals)    Historical Provider, MD   Dulaglutide (TRULICITY) 1.5 MU/0.7JH SOPN Inject 0.5 mLs into the skin once a week     Historical Provider, MD   tiZANidine (ZANAFLEX) 4 MG tablet Take 4 mg by mouth every 8 hours as needed    Historical Provider, MD   levothyroxine (SYNTHROID) 75 MCG tablet Take 75 mcg by mouth Daily     Historical Provider, MD       Current medications:    Current Facility-Administered Medications   Medication Dose Route Frequency Provider Last Rate Last Admin    lactated ringers infusion   IntraVENous Continuous Aleshia Fuller MD        sodium chloride flush 0.9 % injection 5-40 mL  5-40 mL IntraVENous 2 times per day Aleshia Fuller MD        sodium chloride flush 0.9 % injection 5-40 mL  5-40 mL IntraVENous PRN Aleshia Fuller MD        0.9 % sodium chloride infusion  25 mL IntraVENous PRN Aleshia Fuller MD        0.9 % sodium chloride infusion   IntraVENous Continuous Yanna Escobar MD 50 mL/hr at 12/16/21 0749 New Bag at 12/16/21 0749       Allergies:     Allergies   Allergen Reactions    Latex Rash    Ancef [Cefazolin] Shortness Of Breath     Nausea, itching, shortness of breath, flushing, reddness    Codeine Other (See Comments)     \"makes me catatonic\"       Problem List:    Patient Active Problem List   Diagnosis Code    Obesity E66.9    Incisional hernia K43.2    Colon polyp K63.5    S/P abdominal wall reconstruction with component separation and biologic mesh Z98.890, Z87.19    Diabetes mellitus, type II ([address] E11.9    Red blood cell antibody positive R76.8    Hyperbilirubinemia E80.6    Postmenopausal bleeding N95.0    Diabetic ketoacidosis without coma associated with type 2 diabetes mellitus (HCC) E11.10    Abnormal transaminases R74.8    Right upper quadrant abdominal pain R10.11       Past Medical History:        Diagnosis Date    Arthritis     left shoulder    Constipation     Diabetes mellitus ([address]     Difficult intubation 2017    deviated Trachea    Headache     History of hernia repair     HX OTHER MEDICAL     hard to wake after anesth    Hypertension     Neck pain     Numbness     Obesities, morbid ([address]     Other abnormal clinical finding dec 2014     hospitalized [facility] pneumonia had ercp blood infection     Pain     Pneumonia 4-5 yrs ago also 2014    Postmenopausal bleeding 2015    Prolonged emergence from general anesthesia     Thyroid disease        Past Surgical History:        Procedure Laterality Date    ABDOMEN SURGERY  10/26/2012    hernia    ABDOMINAL WALL SURGERY  2012     Abdominal Wound Dehiscence Repair    APPENDECTOMY      CARPAL TUNNEL RELEASE       SECTION      x2    CHOLECYSTECTOMY  2011    open    COLONOSCOPY      DILATION AND CURETTAGE OF UTERUS  2015    hysteroscopy    ERCP  dec 2014   [address] HERNIA REPAIR  2013    exploratory laparotomy, lysis of adhesions (85 mins), repair of ventral incisional hernia with biologic mesh (Dorion); bilateral myofascial release (Cash)    NECK SURGERY       stacie in neck    [place] COLSC FLX W/RMVL OF TUMOR POLYP LESION SNARE TQ  2013    Dr. Sarwat [facility] ENDOSCOPY         Social History:    Social History     Tobacco Use    Smoking status: Former Smoker     Packs/day: 1.00     Types: Cigarettes     Quit date: 10/7/2012     Years since quittin.1    Smokeless tobacco: Never Used    Tobacco comment: quit    Substance Use Topics    Alcohol use: No     Comment: 1 cup of coffee a day                                 Counseling given: Not Answered  Comment: quit       Vital Signs (Current):   Vitals:    21 0740   BP: (!) 158/79   Pulse: 87   Resp: 16   Temp: 97.6 °F (36.4 °C)   TempSrc: Infrared   SpO2: 93%                                              BP Readings from Last 3 Encounters:   21 (!) 158/79   12/10/21 (!) 140/64   21 139/76       NPO Status: Time of last liquid consumption:                         Time of last solid consumption:                         Date of last liquid consumption: 12/15/21                        Date of last solid food consumption: 12/15/21    BMI:   Wt Readings from Last 3 Encounters: 12/10/21 (!) 328 lb (148.8 kg)   10/27/21 (!) 317 lb (143.8 kg)   08/26/21 (!) 317 lb (143.8 kg)     There is no height or weight on file to calculate BMI.    CBC:   Lab Results   Component Value Date    WBC 6.6 12/10/2021    RBC 4.17 12/10/2021    HGB 13.0 12/10/2021    HCT 41.9 12/10/2021    .5 12/10/2021    RDW 13.7 12/10/2021     12/10/2021       CMP:   Lab Results   Component Value Date     12/10/2021    K 4.6 12/10/2021     12/10/2021    CO2 25 12/10/2021    BUN 15 12/10/2021    CREATININE 0.8 12/10/2021    GFRAA >60 12/10/2021    LABGLOM >60 12/10/2021    GLUCOSE 140 12/10/2021    GLUCOSE 167 04/27/2012    PROT 7.5 11/08/2021    CALCIUM 8.9 12/10/2021    BILITOT 0.5 11/08/2021    ALKPHOS 92 11/08/2021    AST 13 11/08/2021    ALT 14 11/08/2021       POC Tests: No results for input(s): POCGLU, POCNA, POCK, POCCL, POCBUN, POCHEMO, POCHCT in the last 72 hours.     Coags:   Lab Results   Component Value Date    PROTIME 11.9 08/27/2016    INR 1.1 08/27/2016    APTT 28.1 08/27/2016       HCG (If Applicable):   Lab Results   Component Value Date    PREGTESTUR NEGATIVE 01/26/2015        ABGs: No results found for: PHART, PO2ART, RMU9OPJ, OAR0ITW, BEART, D7FRYMXA     Type & Screen (If Applicable):  No results found for: LABABO, LABRH    Drug/Infectious Status (If Applicable):  No results found for: HIV, HEPCAB    COVID-19 Screening (If Applicable): No results found for: COVID19        Anesthesia Evaluation  Patient summary reviewed no history of anesthetic complications:   Airway: Mallampati: III  TM distance: >3 FB   Neck ROM: full  Mouth opening: > = 3 FB Dental:    (+) edentulous      Pulmonary: breath sounds clear to auscultation  (+) sleep apnea: on CPAP,      (-) pneumonia                          ROS comment: Former smoker   Deviated trachea   Cardiovascular:    (+) hypertension:,         Rhythm: regular             Beta Blocker:  Not on Beta Blocker         Neuro/Psych:   (+) headaches:,             GI/Hepatic/Renal:   (+) morbid obesity          Endo/Other:    (+) DiabetesType II DM, , hypothyroidism::., .                  ROS comment: Postmenopausal bleeding Abdominal:             Vascular: negative vascular ROS. Other Findings:           Anesthesia Plan      general     ASA 3       Induction: intravenous. MIPS: Postoperative opioids intended and Prophylactic antiemetics administered. Anesthetic plan and risks discussed with patient. Plan discussed with CRNA. [address],    12/16/2021      Patient seen and evaluated preoperatively.   CATE Aguayo - CRNA

## 2021-12-30 ENCOUNTER — TELEPHONE (OUTPATIENT)
Dept: ORTHOPEDIC SURGERY | Age: 61
End: 2021-12-30

## 2022-01-20 ENCOUNTER — NURSE ONLY (OUTPATIENT)
Dept: ORTHOPEDIC SURGERY | Age: 62
End: 2022-01-20
Payer: COMMERCIAL

## 2022-01-20 DIAGNOSIS — M17.11 PRIMARY OSTEOARTHRITIS OF RIGHT KNEE: Primary | ICD-10-CM

## 2022-01-20 DIAGNOSIS — M17.12 PRIMARY OSTEOARTHRITIS OF LEFT KNEE: ICD-10-CM

## 2022-01-20 PROCEDURE — 20610 DRAIN/INJ JOINT/BURSA W/O US: CPT | Performed by: NURSE PRACTITIONER

## 2022-01-20 NOTE — PROGRESS NOTES
Chief Complaint   Patient presents with    Injections     b/l zilretta        I will proceed with a cortisone injection in the Bilateral knee. Verbal and written consent was obtained for the injections. The skin was prepped with alcohol. A prepared mixture of 32 mg of Zilretta and 5mL diluent was injected to Bilateral knee. The injection was given through the lateral side of the knee. The patient tolerated the injection well. I will see the patient back prn. Kraig Daily was seen today for injections.     Diagnoses and all orders for this visit:    Primary osteoarthritis of right knee  -     20610 - ME DRAIN/INJECT LARGE JOINT/BURSA    Primary osteoarthritis of left knee  -     20610 - ME DRAIN/INJECT LARGE JOINT/BURSA    Other orders  -     triamcinolone acetonide (ZILRETTA) intra-articular injection 32 mg  -     triamcinolone acetonide (ZILRETTA) intra-articular injection 32 mg

## 2022-02-02 ENCOUNTER — TELEPHONE (OUTPATIENT)
Dept: SLEEP CENTER | Age: 62
End: 2022-02-02

## 2022-02-02 ASSESSMENT — SLEEP AND FATIGUE QUESTIONNAIRES
HOW LIKELY ARE YOU TO NOD OFF OR FALL ASLEEP WHEN YOU ARE A PASSENGER IN A CAR FOR AN HOUR WITHOUT A BREAK: 0
HOW LIKELY ARE YOU TO NOD OFF OR FALL ASLEEP WHILE SITTING INACTIVE IN A PUBLIC PLACE: 0
HOW LIKELY ARE YOU TO NOD OFF OR FALL ASLEEP WHILE SITTING AND READING: 0
HOW LIKELY ARE YOU TO NOD OFF OR FALL ASLEEP WHILE SITTING QUIETLY AFTER LUNCH WITHOUT ALCOHOL: 0
ESS TOTAL SCORE: 0
HOW LIKELY ARE YOU TO NOD OFF OR FALL ASLEEP IN A CAR, WHILE STOPPED FOR A FEW MINUTES IN TRAFFIC: 0
HOW LIKELY ARE YOU TO NOD OFF OR FALL ASLEEP WHILE SITTING AND TALKING TO SOMEONE: 0
HOW LIKELY ARE YOU TO NOD OFF OR FALL ASLEEP WHILE WATCHING TV: 0
HOW LIKELY ARE YOU TO NOD OFF OR FALL ASLEEP WHILE LYING DOWN TO REST IN THE AFTERNOON WHEN CIRCUMSTANCES PERMIT: 0

## 2022-02-02 NOTE — TELEPHONE ENCOUNTER
Call to pt to change appt 2-3-22 to VV or to reschedule appt. D/t NP n/a in office.  LM with call back #

## 2022-02-03 ENCOUNTER — TELEMEDICINE (OUTPATIENT)
Dept: SLEEP CENTER | Age: 62
End: 2022-02-03
Payer: COMMERCIAL

## 2022-02-03 DIAGNOSIS — G47.33 OBSTRUCTIVE SLEEP APNEA: Primary | ICD-10-CM

## 2022-02-03 DIAGNOSIS — E66.9 OBESITY, UNSPECIFIED CLASSIFICATION, UNSPECIFIED OBESITY TYPE, UNSPECIFIED WHETHER SERIOUS COMORBIDITY PRESENT: ICD-10-CM

## 2022-02-03 PROCEDURE — G8427 DOCREV CUR MEDS BY ELIG CLIN: HCPCS | Performed by: NURSE PRACTITIONER

## 2022-02-03 PROCEDURE — 99242 OFF/OP CONSLTJ NEW/EST SF 20: CPT | Performed by: NURSE PRACTITIONER

## 2022-02-03 NOTE — LETTER
[place] [facility] [address] [place] [address]  Phone: [phone]  Fax: [phone]           CATE Rogers CNP      February 3, 2022     Patient: Kashmir Patino   MR Number: [de-identified]   YOB: 1960   Date of Visit: 2/3/2022       Dear Dr. Amanuel Flynn: Thank you for referring Rhoda Hall to me for evaluation/treatment. Below are the relevant portions of my assessment and plan of care. If you have questions, please do not hesitate to call me. I look forward to following Yohan Meehan along with you.     Sincerely,        CATE Rogers CNP    CC providers:  CATE Maloney CNP  [facility] [address]  Via Fax: [phone]
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
## 2022-02-11 ENCOUNTER — TELEPHONE (OUTPATIENT)
Dept: SLEEP CENTER | Age: 62
End: 2022-02-11

## 2022-02-11 ENCOUNTER — HOSPITAL ENCOUNTER (OUTPATIENT)
Dept: MAMMOGRAPHY | Age: 62
Discharge: HOME | End: 2022-02-13
Payer: COMMERCIAL

## 2022-02-11 VITALS — BODY MASS INDEX: 47.09 KG/M2 | HEIGHT: 66 IN | WEIGHT: 293 LBS

## 2022-02-11 DIAGNOSIS — Z12.31 ENCOUNTER FOR SCREENING MAMMOGRAM FOR MALIGNANT NEOPLASM OF BREAST: ICD-10-CM

## 2022-02-11 PROCEDURE — 77067 SCR MAMMO BI INCL CAD: CPT

## 2022-03-09 ENCOUNTER — HOSPITAL ENCOUNTER (OUTPATIENT)
Dept: GENERAL RADIOLOGY | Age: 62
Discharge: HOME | End: 2022-03-11
Payer: COMMERCIAL

## 2022-03-09 ENCOUNTER — HOSPITAL ENCOUNTER (OUTPATIENT)
Age: 62
Discharge: HOME | End: 2022-03-11
Payer: COMMERCIAL

## 2022-03-09 DIAGNOSIS — J06.9 ACUTE RESPIRATORY DISEASE: ICD-10-CM

## 2022-03-09 PROCEDURE — 71046 X-RAY EXAM CHEST 2 VIEWS: CPT

## 2022-03-30 ENCOUNTER — HOSPITAL ENCOUNTER (OUTPATIENT)
Dept: INTERVENTIONAL RADIOLOGY/VASCULAR | Age: 62
Discharge: HOME | End: 2022-04-01
Payer: COMMERCIAL

## 2022-03-30 DIAGNOSIS — R60.0 LOWER EXTREMITY EDEMA: ICD-10-CM

## 2022-03-30 DIAGNOSIS — R60.0 LEG EDEMA: ICD-10-CM

## 2022-03-30 PROCEDURE — 93925 LOWER EXTREMITY STUDY: CPT

## 2022-03-30 PROCEDURE — 93970 EXTREMITY STUDY: CPT

## 2022-04-12 ENCOUNTER — TELEPHONE (OUTPATIENT)
Dept: SLEEP CENTER | Age: 62
End: 2022-04-12

## 2022-04-13 ENCOUNTER — HOSPITAL ENCOUNTER (OUTPATIENT)
Dept: SLEEP CENTER | Age: 62
Discharge: HOME | End: 2022-04-13
Payer: COMMERCIAL

## 2022-04-13 DIAGNOSIS — G47.33 OBSTRUCTIVE SLEEP APNEA: ICD-10-CM

## 2022-04-13 PROCEDURE — 95811 POLYSOM 6/>YRS CPAP 4/> PARM: CPT

## 2022-04-18 PROCEDURE — 95811 POLYSOM 6/>YRS CPAP 4/> PARM: CPT | Performed by: INTERNAL MEDICINE

## 2022-04-18 NOTE — PROGRESS NOTES
[address]                               SLEEP STUDY REPORT    PATIENT NAME: Yahaira Murphy                   :        1960  MED REC NO:   [id number]                            ROOM:  ACCOUNT NO:   [de-identified]                           ADMIT DATE: 2022  PROVIDER:     Rigoberto Cotnreras MD    DATE OF STUDY:  2022    SPLIT NIGHT POLYSOMNOGRAM REPORT    LOCATION:  [address] [place] [address]. REFERRING PROVIDER:  CATE Adkins    AGE: 57 yrs       SEX: Female          HEIGHT: 5 ft   6 in         WEIGHT: 316 lbs          BMI: 51 kg/m2    NECK CIRCUMFERENCE: 20 in    Symptoms: Excessive daytime sleepiness, napping, witnessed apneas, morning headaches, sleep talking, restless legs, difficulty falling/staying asleep, waking earlier than desired. The [place] Sleepiness Scale was 3 out of 24 (scores above or equal to 10 are suggestive of hypersomnolence). Indication: To reevaluate severity of obstructive sleep apnea and optimal PAP therapy settings, as part of bariatric surgery work-up. Medical History: Morbid obesity, hypertension, diabetes, hypothyroidism, sinus issues, and known history of obstructive sleep apnea noncompliant with PAP therapy (PSG  with AHI 18.5, REM RDI 65, SPO2 tanesha 64%, T88 14%; titrated to bilevel PAP 20/16 cm of water). Medications: Gabapentin, metformin, levothyroxine, metoprolol, Trulicity, topiramate, tizanidine, oxycodone, nystatin powder, spironolactone, diclofenac, multivitamin, vitamin B12, vitamin D3, vitamin C, zinc.    DESCRIPTION: This split night polysomnogram consisted of EEG, EOG, EMG and 2-lead ECG monitoring.   Oronasal airflow (nasal pressure transducer, thermistor, and internal CPAP/bi-level PAP flow signal), chest and abdominal efforts by respiratory inductance plethysmography or polyvinylidene fluoride (PVDF) sensor, and pulse oximetry were monitored as well. Hypopneas were scored as at least a 30% reduction in amplitude of the semi-quantitative flow signal, associated with a 4% or greater oxygen desaturation. Respiratory effort related arousals (RERAs) were scored as at least a 30% reduction in amplitude of the semi-quantitative flow signal, associated with an EEG microarousal. During this study, a titration of positive airway pressure was begun after a baseline/diagnostic recording time of 235 minutes that included 123 minutes of sleep. FINDINGS:  SLEEP CONTINUITY AND SLEEP ARCHITECTURE:  Lights were turned off at 9:14 PM and lights were turned on at 5:14 AM. Total recording time was 477 minutes and total sleep time was 242 minutes. Sleep onset latency was significantly increased at 109 minutes and REM latency was decreased to 15 minutes. The amount of N1 sleep was 7% of total sleep time, or 17 minutes. The amount of N2 sleep was 54.5% of total sleep time, or 132 minutes. The amount of REM sleep was 27.5% of total sleep time, or 67 minutes. Slow wave sleep was 11% of total sleep time, or 27 minutes. The pre-treatment microarousal index was normal. The pre-treatment sleep efficiency was 52% and sleep efficiency after initiating positive airway pressure therapy was 49%. Sleep architecture improved with the use of positive airway pressure therapy, as evidenced by significant REM rebound, as compared to the pre-treatment baseline recording. RESPIRATORY MONITORING:  The pre-treatment portion of the study documented   3 obstructive apneas, 0 central apneas, 0 mixed apneas, 60 hypopneas, and 0 respiratory effort related arousals (RERAs) during the total recorded sleep time. The pre-treatment apnea/hypopnea index (AHI) was 30.7. The pre-treatment respiratory disturbance index (RDI includes RERAs) was 30.7. The non-REM RDI was  28.7 and the REM RDI was 54 (the patient achieved just 10 minutes of REM sleep during the diagnostic portion).   REM sleep was associated with profound, prolonged desaturations. The patient slept supine, with the head and foot of the bed elevated, as is her practice at home, for the entire study. The pre-treatment 4% oxygen desaturation index during sleep was 30.7. The average oxygen saturation in the diagnostic portion study was 91% while awake, 86% during non-REM sleep, and 8% during REM sleep. The lowest oxygen saturation during sleep was 66%, which occurred during REM sleep. Oxygen saturations were less than or equal to 88% for 113 minutes or 92% of the diagnostic portion of the study. Mild snoring was noted. EEG:  No abnormal epileptiform activity was observed. ECG: The electrocardiogram documented sinus tachycardia. The average heart rate during sleep was 97 beats per minute during the diagnostic portion of the study and 92 beats per minute during the treatment portion of the study. PERIODIC LIMB MOVEMENTS: Rare periodic limb movements were noted. EMG/VIDEO MONITORING: Loss of REM atonia, bruxism, and parasomnias were not observed. TREATMENT: After the diagnostic portion of the study, CPAP was initiated at 5 cm of water pressure and titrated to 11 cm of water in order to abolish respiratory events. Due to the persistence of respiratory events and hypoxia, particularly in REM sleep, titration with bi-level PAP from 12/8 to 18/12 cm of water was performed. Respiratory events were significantly improved with the use of positive airway pressure therapy, but hypoxia was not resolved. Titration was limited by short total sleep time. Titration at the final bilevel PAP of 18/12 cm of water resulted in an AHI of 2.9, a minimum oxyhemoglobin saturation of 77%, and an average oxyhemoglobin saturation of 89%. Time spent on these setting included 3 minutes of supine REM sleep. No supplemental oxygen was added during the study.   Of the 242 minutes of positive airway pressure titration time, the patient was awake for 123 minutes. IMPRESSION:  1. This study demonstrated severe obstructive sleep apnea associated with profound hypoxia, particularly in REM sleep. 2.  A split-night study was performed, and the patient's sleep-disordered breathing was partially treated with PAP therapy. Respiratory events were significantly improved, but hypoxia remained at the final trialed settings. Titration was limited by short sleep time. 3.  Subjectively, the patient reported some difficulty acclimating to PAP therapy, but being more comfortable with a nasal interface. She stated that she is willing to use PAP therapy at home on a chronic basis. 4.  Sinus tachycardia was observed on ECG. RECOMMENDATIONS:    1.  Bilevel PAP therapy at settings of 20/16 cm of water is recommended and should be ordered by the referring provider. Equipment ordering information is below. These empiric settings are based upon the need for increased EPAP to correct persistent hypoxia noted at the final trialed pressures in this study. 2.  Per insurance requirements, the patient must be seen in clinical follow up within 3 months of receiving bilevel PAP therapy in order to document adherence to therapy, assess efficacy of the prescribed settings, and evaluate resolution of sleep-related complaints. Once adherence with an efficacy of the prescribed settings has been confirmed, overnight oximetry should be performed in order to verify normalization of oxygen saturations. 3.  The patient should be strongly counseled against driving while drowsy. 4.  Weight loss efforts should be encouraged, as the severity of obstructive sleep apnea may improve although no guarantee of cure can be made. 5.  Clinical correlation of the sinus tachycardia noted on the study is recommended. The patient's primary care provider will be made aware. It is likely in part related to sympathetic stress secondary to untreated HAYES with profound hypoxia.     EQUIPMENT ORDERING INFORMATION:  DEVICE: Bilevel PAP with heated humidification and a remote modem. SETTINGS: IPAP (inspiratory positive airway pressure) 20 cm of water. EPAP (expiratory positive airway pressure) 16 cm of water. MASK TYPE: Respironics DreamWear nasal mask. MASK SIZE: Small frame, small cushion. SUPPLEMENTAL OXYGEN:  None.         J Carlos Pretty MD    D: 04/18/2022 10:03:59       T: 04/18/2022 10:34:17     [email or web address]  Job#: [id number]     Doc#: [id number]    CC:

## 2022-04-19 ENCOUNTER — TELEPHONE (OUTPATIENT)
Dept: SLEEP CENTER | Age: 62
End: 2022-04-19

## 2022-04-19 DIAGNOSIS — G47.33 OBSTRUCTIVE SLEEP APNEA: Primary | ICD-10-CM

## 2022-04-19 NOTE — PROGRESS NOTES
Patient's split night PSG interpreted, consistent with severe HAYES with significant oxygen desaturation. Bi-level therapy of 20/16 cmH2O ordered, followed by overnight oximetry once confirmation of sleep apnea events are in control. Patient will be notified of results and order will be sent to preferred DME. She will be reminded of insurance requirements for compliance and 30-day window to exchange mask interface. She will follow up in clinic within 3 months. Of note, patient's ECG read sinus tachycardia with an average heart rate of 97 beats per minute during diagnostic portion of study and 92 beats per minute during treatment portions. Patient's PCP will be notified of the following.     CATE John - CNP

## 2022-04-19 NOTE — TELEPHONE ENCOUNTER
Spoke with pt re SS results (severe HAYES/O2 desat)   recommends BiPAP therapy. Pt is amendable to this. She has chosen [facility] for her DME. Pt was informed of Insurance policy for mask exchange within first 30 days of therapy. Was also informed of compliance policy per insurance. appt was moved  To 6/25 to meet compliance. All info will be sent to [facility].   phy note and SS results also sent to PCP to notify of tachycardia during SS

## 2022-04-21 ENCOUNTER — NURSE ONLY (OUTPATIENT)
Dept: ORTHOPEDIC SURGERY | Age: 62
End: 2022-04-21
Payer: COMMERCIAL

## 2022-04-21 DIAGNOSIS — M17.12 PRIMARY OSTEOARTHRITIS OF LEFT KNEE: ICD-10-CM

## 2022-04-21 DIAGNOSIS — M17.11 PRIMARY OSTEOARTHRITIS OF RIGHT KNEE: Primary | ICD-10-CM

## 2022-04-21 PROCEDURE — 99999 PR OFFICE/OUTPT VISIT,PROCEDURE ONLY: CPT | Performed by: NURSE PRACTITIONER

## 2022-04-21 PROCEDURE — 20610 DRAIN/INJ JOINT/BURSA W/O US: CPT | Performed by: NURSE PRACTITIONER

## 2022-04-21 NOTE — PROGRESS NOTES
.  Chief Complaint   Patient presents with    Injections     b/l zilretta         I will proceed with a cortisone injection in the Bilateral knee. Verbal and written consent was obtained for the injections. The skin was prepped with alcohol. A prepared mixture of 32 mg of Zilretta and 5mL diluent was injected to Bilateral knee. The injection was given through the lateral side of the knee. The patient tolerated the injection well. I will see the patient back prn. Stephanie Zuniga was seen today for injections.     Diagnoses and all orders for this visit:    Primary osteoarthritis of right knee  -     20610 - PA DRAIN/INJECT LARGE JOINT/BURSA    Primary osteoarthritis of left knee  -     20610 - PA DRAIN/INJECT LARGE JOINT/BURSA    Other orders  -     triamcinolone acetonide (ZILRETTA) intra-articular injection 32 mg  -     triamcinolone acetonide (ZILRETTA) intra-articular injection 32 mg

## 2022-06-20 ASSESSMENT — SLEEP AND FATIGUE QUESTIONNAIRES
HOW LIKELY ARE YOU TO NOD OFF OR FALL ASLEEP WHEN YOU ARE A PASSENGER IN A CAR FOR AN HOUR WITHOUT A BREAK: 0
HOW LIKELY ARE YOU TO NOD OFF OR FALL ASLEEP IN A CAR, WHILE STOPPED FOR A FEW MINUTES IN TRAFFIC: 0
HOW LIKELY ARE YOU TO NOD OFF OR FALL ASLEEP WHILE LYING DOWN TO REST IN THE AFTERNOON WHEN CIRCUMSTANCES PERMIT: 0
HOW LIKELY ARE YOU TO NOD OFF OR FALL ASLEEP WHILE SITTING INACTIVE IN A PUBLIC PLACE: 0
HOW LIKELY ARE YOU TO NOD OFF OR FALL ASLEEP WHILE SITTING AND TALKING TO SOMEONE: 0
HOW LIKELY ARE YOU TO NOD OFF OR FALL ASLEEP WHILE WATCHING TV: 2
HOW LIKELY ARE YOU TO NOD OFF OR FALL ASLEEP WHILE SITTING QUIETLY AFTER LUNCH WITHOUT ALCOHOL: 0
HOW LIKELY ARE YOU TO NOD OFF OR FALL ASLEEP WHILE SITTING AND READING: 1
ESS TOTAL SCORE: 3

## 2022-06-22 ENCOUNTER — TELEPHONE (OUTPATIENT)
Dept: ADMINISTRATIVE | Age: 62
End: 2022-06-22

## 2022-06-22 ENCOUNTER — TELEMEDICINE (OUTPATIENT)
Dept: SLEEP MEDICINE | Age: 62
End: 2022-06-22
Payer: COMMERCIAL

## 2022-06-22 DIAGNOSIS — G47.33 OBSTRUCTIVE SLEEP APNEA: Primary | ICD-10-CM

## 2022-06-22 DIAGNOSIS — G47.34 NOCTURNAL OXYGEN DESATURATION: ICD-10-CM

## 2022-06-22 DIAGNOSIS — E66.9 OBESITY, UNSPECIFIED CLASSIFICATION, UNSPECIFIED OBESITY TYPE, UNSPECIFIED WHETHER SERIOUS COMORBIDITY PRESENT: ICD-10-CM

## 2022-06-22 PROCEDURE — 99213 OFFICE O/P EST LOW 20 MIN: CPT | Performed by: NURSE PRACTITIONER

## 2022-06-22 PROCEDURE — 3017F COLORECTAL CA SCREEN DOC REV: CPT | Performed by: NURSE PRACTITIONER

## 2022-06-22 PROCEDURE — G8427 DOCREV CUR MEDS BY ELIG CLIN: HCPCS | Performed by: NURSE PRACTITIONER

## 2022-06-22 NOTE — TELEPHONE ENCOUNTER
Patient states [facility] is needing a copy of her sleep study results. Patient is having Bariatric surgery.      Fax [phone]
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
## 2022-06-24 NOTE — TELEPHONE ENCOUNTER
Patient stated she received call from office advising they would mail a release to her. Patient will reach back if she has further questions.

## 2022-07-06 ENCOUNTER — HOSPITAL ENCOUNTER (OUTPATIENT)
Dept: GENERAL RADIOLOGY | Age: 62
Discharge: HOME | End: 2022-07-08
Payer: COMMERCIAL

## 2022-07-06 ENCOUNTER — HOSPITAL ENCOUNTER (OUTPATIENT)
Age: 62
Discharge: HOME | End: 2022-07-08
Payer: COMMERCIAL

## 2022-07-06 DIAGNOSIS — M47.816 SPONDYLOSIS OF LUMBAR REGION WITHOUT MYELOPATHY OR RADICULOPATHY: ICD-10-CM

## 2022-07-06 PROCEDURE — 72120 X-RAY BEND ONLY L-S SPINE: CPT
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
## 2022-07-27 ENCOUNTER — TELEMEDICINE (OUTPATIENT)
Dept: SLEEP MEDICINE | Age: 62
End: 2022-07-27
Payer: COMMERCIAL

## 2022-07-27 ENCOUNTER — TELEPHONE (OUTPATIENT)
Dept: SLEEP MEDICINE | Age: 62
End: 2022-07-27

## 2022-07-27 ENCOUNTER — TELEPHONE (OUTPATIENT)
Dept: ADMINISTRATIVE | Age: 62
End: 2022-07-27

## 2022-07-27 DIAGNOSIS — G47.34 NOCTURNAL OXYGEN DESATURATION: ICD-10-CM

## 2022-07-27 DIAGNOSIS — E66.9 OBESITY, UNSPECIFIED CLASSIFICATION, UNSPECIFIED OBESITY TYPE, UNSPECIFIED WHETHER SERIOUS COMORBIDITY PRESENT: ICD-10-CM

## 2022-07-27 DIAGNOSIS — G47.33 OBSTRUCTIVE SLEEP APNEA: ICD-10-CM

## 2022-07-27 DIAGNOSIS — G47.33 OSA (OBSTRUCTIVE SLEEP APNEA): Primary | ICD-10-CM

## 2022-07-27 PROCEDURE — 3017F COLORECTAL CA SCREEN DOC REV: CPT | Performed by: NURSE PRACTITIONER

## 2022-07-27 PROCEDURE — 99213 OFFICE O/P EST LOW 20 MIN: CPT | Performed by: NURSE PRACTITIONER

## 2022-07-27 PROCEDURE — G8427 DOCREV CUR MEDS BY ELIG CLIN: HCPCS | Performed by: NURSE PRACTITIONER

## 2022-07-27 ASSESSMENT — SLEEP AND FATIGUE QUESTIONNAIRES
HOW LIKELY ARE YOU TO NOD OFF OR FALL ASLEEP WHILE SITTING AND READING: 1
HOW LIKELY ARE YOU TO NOD OFF OR FALL ASLEEP WHILE SITTING AND TALKING TO SOMEONE: 0
HOW LIKELY ARE YOU TO NOD OFF OR FALL ASLEEP WHILE SITTING INACTIVE IN A PUBLIC PLACE: 0
HOW LIKELY ARE YOU TO NOD OFF OR FALL ASLEEP WHILE LYING DOWN TO REST IN THE AFTERNOON WHEN CIRCUMSTANCES PERMIT: 3
HOW LIKELY ARE YOU TO NOD OFF OR FALL ASLEEP WHILE WATCHING TV: 0
HOW LIKELY ARE YOU TO NOD OFF OR FALL ASLEEP WHEN YOU ARE A PASSENGER IN A CAR FOR AN HOUR WITHOUT A BREAK: 0
HOW LIKELY ARE YOU TO NOD OFF OR FALL ASLEEP WHILE SITTING QUIETLY AFTER LUNCH WITHOUT ALCOHOL: 0
HOW LIKELY ARE YOU TO NOD OFF OR FALL ASLEEP IN A CAR, WHILE STOPPED FOR A FEW MINUTES IN TRAFFIC: 0
ESS TOTAL SCORE: 4

## 2022-07-27 NOTE — TELEPHONE ENCOUNTER
Patient would like to know if office can give patient Kenalog injection since ins denied zilretta.  Please advise

## 2022-08-02 DIAGNOSIS — M25.561 ACUTE PAIN OF BOTH KNEES: Primary | ICD-10-CM

## 2022-08-02 DIAGNOSIS — M25.562 ACUTE PAIN OF BOTH KNEES: Primary | ICD-10-CM

## 2022-08-03 ENCOUNTER — OFFICE VISIT (OUTPATIENT)
Dept: ORTHOPEDIC SURGERY | Age: 62
End: 2022-08-03
Payer: COMMERCIAL

## 2022-08-03 VITALS — BODY MASS INDEX: 47.09 KG/M2 | HEIGHT: 66 IN | WEIGHT: 293 LBS

## 2022-08-03 DIAGNOSIS — M25.561 ACUTE PAIN OF BOTH KNEES: Primary | ICD-10-CM

## 2022-08-03 DIAGNOSIS — M25.562 ACUTE PAIN OF BOTH KNEES: Primary | ICD-10-CM

## 2022-08-03 DIAGNOSIS — M17.11 PRIMARY OSTEOARTHRITIS OF RIGHT KNEE: ICD-10-CM

## 2022-08-03 DIAGNOSIS — M17.12 PRIMARY OSTEOARTHRITIS OF LEFT KNEE: ICD-10-CM

## 2022-08-03 PROCEDURE — G8427 DOCREV CUR MEDS BY ELIG CLIN: HCPCS | Performed by: NURSE PRACTITIONER

## 2022-08-03 PROCEDURE — 3017F COLORECTAL CA SCREEN DOC REV: CPT | Performed by: NURSE PRACTITIONER

## 2022-08-03 PROCEDURE — 99213 OFFICE O/P EST LOW 20 MIN: CPT | Performed by: NURSE PRACTITIONER

## 2022-08-03 PROCEDURE — 20610 DRAIN/INJ JOINT/BURSA W/O US: CPT | Performed by: NURSE PRACTITIONER

## 2022-08-03 PROCEDURE — 1036F TOBACCO NON-USER: CPT | Performed by: NURSE PRACTITIONER

## 2022-08-03 PROCEDURE — G8417 CALC BMI ABV UP PARAM F/U: HCPCS | Performed by: NURSE PRACTITIONER

## 2022-08-03 RX ORDER — GLUCOSAMINE/D3/BOSWELLIA SERRA 1500MG-400
TABLET ORAL DAILY
COMMUNITY

## 2022-08-03 RX ORDER — METHOCARBAMOL 500 MG/1
TABLET, FILM COATED ORAL
COMMUNITY
Start: 2022-07-25

## 2022-08-04 RX ORDER — TRIAMCINOLONE ACETONIDE 40 MG/ML
40 INJECTION, SUSPENSION INTRA-ARTICULAR; INTRAMUSCULAR ONCE
Status: COMPLETED | OUTPATIENT
Start: 2022-08-04 | End: 2022-08-04

## 2022-08-04 RX ADMIN — TRIAMCINOLONE ACETONIDE 40 MG: 40 INJECTION, SUSPENSION INTRA-ARTICULAR; INTRAMUSCULAR at 21:02

## 2022-08-05 NOTE — PROGRESS NOTES
Chief Complaint   Patient presents with    Knee Pain     Patient here for bilateral knee pain. Patient states that the right knee is worse than the left. Michael Lopez returns today for follow-up of her right knee pain. She states her left knee is still doing well.     Past Medical History:   Diagnosis Date    Arthritis     left shoulder    Constipation     Diabetes mellitus ([address]     Difficult intubation     deviated Trachea    Headache     History of hernia repair     HX OTHER MEDICAL     hard to wake after anesth    Hypertension     Neck pain     Numbness     Obesities, morbid ([address]     Other abnormal clinical finding dec 2014     hospitalized [facility] pneumonia had ercp blood infection     Pain     Pneumonia 4-5 yrs ago also 2014    Postmenopausal bleeding 2015    Prolonged emergence from general anesthesia     Thyroid disease      Past Surgical History:   Procedure Laterality Date    ABDOMEN SURGERY  10/26/2012    hernia    ABDOMINAL WALL SURGERY  2012     Abdominal Wound Dehiscence Repair    APPENDECTOMY      CARPAL TUNNEL RELEASE       SECTION      x2    CHOLECYSTECTOMY      open    COLONOSCOPY      DILATION AND CURETTAGE OF UTERUS  2015    hysteroscopy    DILATION AND CURETTAGE OF UTERUS N/A 2021    DILATATION AND CURETTAGE HYSTEROSCOPY performed by Lyn Bishop MD at [address]    ERCP  dec 2014    [address]  2013    exploratory laparotomy, lysis of adhesions (85 mins), repair of ventral incisional hernia with biologic mesh (Dorion); bilateral myofascial release (Cash)    NECK SURGERY       stacie in neck    [place] COLSC FLX W/RMVL OF TUMOR POLYP LESION SNARE TQ  2013    Dr. [facility]    TONSILLECTOMY      UPPER GASTROINTESTINAL ENDOSCOPY         Current Outpatient Medications:     Biotin 48661 MCG TABS, Take by mouth daily, Disp: , Rfl:     methocarbamol (ROBAXIN) 500 MG tablet, take 1 tablet twice a day if needed, Disp: , Rfl:     spironolactone (ALDACTONE) 25 MG tablet, Take 25 mg by mouth daily, Disp: , Rfl:     gabapentin (NEURONTIN) 300 MG capsule, take 2 capsules by mouth three times a day, Disp: , Rfl:     nystatin (MYCOSTATIN) POWD powder, Apply topically 2 times daily, Disp: , Rfl:     diclofenac (FLECTOR) 1.3 % PTCH patch, Place 1 patch onto the skin 2 times daily, Disp: , Rfl:     diclofenac sodium (VOLTAREN) 1 % GEL, Apply topically 2 times daily, Disp: , Rfl:     FreeStyle Lancets MISC, 1 each by Does not apply route daily, Disp: , Rfl:     blood glucose test strips (ASCENSIA AUTODISC VI;ONE TOUCH ULTRA TEST VI) strip, 1 each by In Vitro route daily As needed. , Disp: , Rfl:     metFORMIN (GLUCOPHAGE) 1000 MG tablet, Take 1,000 mg by mouth 2 times daily (with meals), Disp: , Rfl:     Dulaglutide 1.5 MG/0.5ML SOPN, Inject 0.5 mLs into the skin once a week , Disp: , Rfl:     Topiramate (TOPAMAX PO), Take 100 mg by mouth 2 times daily , Disp: , Rfl:     levothyroxine (SYNTHROID) 75 MCG tablet, Take 75 mcg by mouth Daily , Disp: , Rfl:     metoprolol (TOPROL-XL) 25 MG XL tablet, Take 25 mg by mouth daily , Disp: , Rfl:     ibuprofen (ADVIL;MOTRIN) 600 MG tablet, Take 1 tablet by mouth once for 1 dose, Disp: 21 tablet, Rfl: 0    tiZANidine (ZANAFLEX) 4 MG tablet, Take 4 mg by mouth every 8 hours as needed (Patient not taking: Reported on 8/3/2022), Disp: , Rfl:   Allergies   Allergen Reactions    Latex Rash    Ancef [Cefazolin] Shortness Of Breath     Nausea, itching, shortness of breath, flushing, reddness    Codeine Other (See Comments)     \"makes me catatonic\"     Social History     Socioeconomic History    Marital status:      Spouse name: Not on file    Number of children: Not on file    Years of education: Not on file    Highest education level: Not on file   Occupational History    Not on file   Tobacco Use    Smoking status: Former     Packs/day: 1.00     Types: Cigarettes     Quit date: 10/7/2012     Years since quittin.8    Smokeless tobacco: Never    Tobacco comments:     quit 2011   Vaping Use    Vaping Use: Never used   Substance and Sexual Activity    Alcohol use: No     Comment: 1 cup of coffee a day     Drug use: No    Sexual activity: Not on file   Other Topics Concern    Not on file   Social History Narrative    Not on file     Social Determinants of Health     Financial Resource Strain: Not on file   Food Insecurity: Not on file   Transportation Needs: Not on file   Physical Activity: Not on file   Stress: Not on file   Social Connections: Not on file   Intimate Partner Violence: Not on file   Housing Stability: Not on file     Family History   Problem Relation Age of Onset    Cancer Father         bladder and colon    Heart Attack Brother          at 46    Cancer Brother         Bladder cancer Per patient    Uterine Cancer Mother         Per patient    Uterine Cancer Daughter         Per patient    Uterine Cancer Niece         Per patient       Review of Systems:     Skin: (-) rash,(-) psoriasis,(-) eczema, (-)skin cancer. Musculoskeletal: (-) fractures,  (-) dislocations,(-) collagen vascular disease, (-) fibromyalgia, (-) multiple sclerosis, (-) muscular dystrophy, (-) RSD,(-) joint pain (-)swelling, (-) joint pain,swelling. Neurologic: (-) epilepsy, (-)seizures,(-) brain tumor,(-) TIA, (-)stroke, (-)headaches, (-)Parkinson disease,(-) memory loss, (-) LOC. Cardiovascular: (-) Chest pain, (-) swelling in legs/feet, (-) SOB, (-) cramping in legs/feet with walking. Constitutional:  The patient is alert and oriented x 3, appears to be stated age and in no distress. Ht 5' 6\" (1.676 m)   Wt (!) 316 lb (143.3 kg)   LMP 2014 Comment: irregular  bleeding  BMI 51.00 kg/m²     Skin:  Upon inspection: the skin appears warm, dry and intact. There is not a previous scar over the affected area. There is not any cellulitis, lymphedema or cutaneous lesions noted in the lower extremities. Upon palpation there is no induration noted. Neurologic:  Gait: normal;  Motor exam of the lower extremities show ; quadriceps, hamstrings, foot dorsi and plantar flexors intact R.  5/5 and L. 5/5. Deep tendon reflexes are 2/4 at the knees and 2/4 at the ankles with strong extensor hallicus longus motor strength bilaterally. Sensory to both feet is intact to all sensory roots. Cardiovascular: The vascular exam is normal and is well perfused to distal extremities. Distal pulses DP/PT: R. 2+; L. 2+. There is cap refill noted less than two seconds in all digits. There is not edema of the bilateral lower extremities. There is not varicosities noted in the distal extremities. Lymph:  Upon palpation,  there is no lymphadenopathy noted in bilateral lower extremities. Musculoskeletal:  Gait: antalgic; examination of the nails and digits reveal no cyanosis or clubbing    Lumbar exam:  On visual inspection, there is no deformity of the spine. full range of motion, no tenderness, palpable spasm or pain on motion. Special tests: Straight Leg Raise negative, Vel testnegative. Hip exam:  Upon inspection, there is no deformity noted. Upon palpation there is not tenderness. ROM: is   full and semetrical.   Strength: Hip Flexors 5/5; Hip Abductors 5/5; Hip Adduction 5/5. Knee exam:  Right knee exam shows;  range of motion of R. Knee is 0 to 115, and L. Knee is 5 to 100. The patient does have  pain on motion, effusion is mild, there is tenderness over the  medial, lateral region, there are not any masses, there is not ligamentous instability, there is not  deformity noted. Knee exam: bilateral positive for moderate crepitations, some mild tenderness laxity is not noted with  stress.   There is not a popliteal cyst.     R. Knee:  Lachman's negative, Anterior Drawer negative, Posterior Drawer negative  Serenity's positive, Thallasy  positive,   PF grind test positive, Apprehension test negative, Patellar J sign  negative  L. Knee:  Lachman's negative, Anterior Drawer negative, Posterior Drawer negative  Serenity's positive, Thallasy  positive,   PF grind test positive, Apprehension test negative,  Patellar J sign  negative     Xray Exam:  Osteoarthritis which is at worst mild at the right knee and minimal at the   left knee. Radiographic findings reviewed with patient       Impression:  Encounter Diagnoses   Name Primary? Acute pain of both knees Yes    Primary osteoarthritis of right knee     Primary osteoarthritis of left knee        Plan:   Natural history and expected course discussed. Questions answered. Educational materials distributed. Rest, ice, compression, and elevation (RICE) therapy. Reduction in offending activity. I had a lengthy discussion with the patient regarding their diagnosis. I explained treatment options including surgical vs non surgical treatment. I reviewed in detail the risks and benefits and outlined the procedure in detail with expected outcomes and possible complications. I also discussed non surgical treatment such as injections (CSI and visco supplementation), physical therapy, topical creams and NSAID's. They have elected for cosnervative management at this time. I will proceed with a cortisone injection in the Right knee. Verbal and written consent was obtained for the injections. The skin was prepped with alcohol. 1mL of Kenalog 40mg and 9mL of 0.25% Marcaine was  injected to Right knee. The injection was given through the lateral side of the knee. The patient tolerated the injection well.  I will see the patient back prn

## 2022-08-31 ENCOUNTER — TELEPHONE (OUTPATIENT)
Dept: ORTHOPEDIC SURGERY | Age: 62
End: 2022-08-31

## 2022-08-31 NOTE — TELEPHONE ENCOUNTER
Lisa Castillo, from [facility], representative of [facility]#[id number] Calling to verify miligrams and vial size used for Hiram Escalera on 4/1/22, 1/20/22, and 9/29/21. If she does not answer can leave voicemail on line [phone].

## 2022-09-01 ENCOUNTER — TELEPHONE (OUTPATIENT)
Dept: ADMINISTRATIVE | Age: 62
End: 2022-09-01

## 2022-09-01 NOTE — TELEPHONE ENCOUNTER
Patient had Cortisone injection on 8/3 and states her knee has gotten extremely worse since then. States there is swelling. Wants to be seen as soon as possible.

## 2022-09-08 ENCOUNTER — TELEPHONE (OUTPATIENT)
Dept: ORTHOPEDIC SURGERY | Age: 62
End: 2022-09-08

## 2022-09-08 NOTE — TELEPHONE ENCOUNTER
Pt's physical therapist, [facility] called in requesting to speak with the office. [facility] can be reached at [phone].

## 2022-09-12 ENCOUNTER — OFFICE VISIT (OUTPATIENT)
Dept: ORTHOPEDIC SURGERY | Age: 62
End: 2022-09-12
Payer: COMMERCIAL

## 2022-09-12 VITALS — BODY MASS INDEX: 47.09 KG/M2 | TEMPERATURE: 98 F | HEIGHT: 66 IN | WEIGHT: 293 LBS

## 2022-09-12 DIAGNOSIS — M79.89 SWELLING OF BOTH LOWER EXTREMITIES: Primary | ICD-10-CM

## 2022-09-12 DIAGNOSIS — M17.11 PRIMARY OSTEOARTHRITIS OF RIGHT KNEE: Primary | ICD-10-CM

## 2022-09-12 PROCEDURE — G8427 DOCREV CUR MEDS BY ELIG CLIN: HCPCS | Performed by: ORTHOPAEDIC SURGERY

## 2022-09-12 PROCEDURE — 3017F COLORECTAL CA SCREEN DOC REV: CPT | Performed by: ORTHOPAEDIC SURGERY

## 2022-09-12 PROCEDURE — 99213 OFFICE O/P EST LOW 20 MIN: CPT | Performed by: ORTHOPAEDIC SURGERY

## 2022-09-12 PROCEDURE — 1036F TOBACCO NON-USER: CPT | Performed by: ORTHOPAEDIC SURGERY

## 2022-09-12 PROCEDURE — G8417 CALC BMI ABV UP PARAM F/U: HCPCS | Performed by: ORTHOPAEDIC SURGERY

## 2022-09-12 NOTE — PROGRESS NOTES
Chief Complaint   Patient presents with    Knee Pain     Right Knee F/U, had cortisone injection on 2022 with only a week of relief. States of a rubber band feeling in her knee and lower leg pain. Eulogio Corbin returns today for follow-up of her right knee pain. she reports this is worse than when I saw her last.  The patient's pain level is a 9/10. The previous treatment was not successful.     Past Medical History:   Diagnosis Date    Arthritis     left shoulder    Constipation     Diabetes mellitus ([address]     Difficult intubation     deviated Trachea    Headache     History of hernia repair     HX OTHER MEDICAL     hard to wake after anesth    Hypertension     Neck pain     Numbness     Obesities, morbid ([address]     Other abnormal clinical finding dec 2014     hospitalized [facility] pneumonia had ercp blood infection     Pain     Pneumonia 4-5 yrs ago also 2014    Postmenopausal bleeding 2015    Prolonged emergence from general anesthesia     Thyroid disease      Past Surgical History:   Procedure Laterality Date    ABDOMEN SURGERY  10/26/2012    hernia    ABDOMINAL WALL SURGERY  2012     Abdominal Wound Dehiscence Repair    APPENDECTOMY      CARPAL TUNNEL RELEASE       SECTION      x2    CHOLECYSTECTOMY      open    COLONOSCOPY      DILATION AND CURETTAGE OF UTERUS  2015    hysteroscopy    DILATION AND CURETTAGE OF UTERUS N/A 2021    DILATATION AND CURETTAGE HYSTEROSCOPY performed by Luis Alberto Ye MD at [address]    ERCP  dec 2014    Trupti Hernandez 19  2013    exploratory laparotomy, lysis of adhesions (85 mins), repair of ventral incisional hernia with biologic mesh (Dorion); bilateral myofascial release (Cash)    NECK SURGERY       stacie in neck    [place] COLSC FLX W/RMVL OF TUMOR POLYP LESION SNARE TQ  2013    Dr. Dallas Sinclair [facility] [address] ENDOSCOPY         Current Outpatient Medications:     Biotin 45091 MCG TABS, Take by mouth daily, Disp: , Rfl:     methocarbamol (ROBAXIN) 500 MG tablet, take 1 tablet twice a day if needed, Disp: , Rfl:     spironolactone (ALDACTONE) 25 MG tablet, Take 25 mg by mouth daily, Disp: , Rfl:     gabapentin (NEURONTIN) 300 MG capsule, take 2 capsules by mouth three times a day, Disp: , Rfl:     nystatin (MYCOSTATIN) POWD powder, Apply topically 2 times daily, Disp: , Rfl:     diclofenac (FLECTOR) 1.3 % PTCH patch, Place 1 patch onto the skin 2 times daily, Disp: , Rfl:     diclofenac sodium (VOLTAREN) 1 % GEL, Apply topically 2 times daily, Disp: , Rfl:     FreeStyle Lancets MISC, 1 each by Does not apply route daily, Disp: , Rfl:     blood glucose test strips (ASCENSIA AUTODISC VI;ONE TOUCH ULTRA TEST VI) strip, 1 each by In Vitro route daily As needed. , Disp: , Rfl:     metFORMIN (GLUCOPHAGE) 1000 MG tablet, Take 1,000 mg by mouth 2 times daily (with meals), Disp: , Rfl:     Dulaglutide 1.5 MG/0.5ML SOPN, Inject 0.5 mLs into the skin once a week , Disp: , Rfl:     tiZANidine (ZANAFLEX) 4 MG tablet, Take 4 mg by mouth every 8 hours as needed, Disp: , Rfl:     Topiramate (TOPAMAX PO), Take 100 mg by mouth 2 times daily , Disp: , Rfl:     levothyroxine (SYNTHROID) 75 MCG tablet, Take 75 mcg by mouth Daily , Disp: , Rfl:     metoprolol (TOPROL-XL) 25 MG XL tablet, Take 25 mg by mouth daily , Disp: , Rfl:     ibuprofen (ADVIL;MOTRIN) 600 MG tablet, Take 1 tablet by mouth once for 1 dose, Disp: 21 tablet, Rfl: 0  Allergies   Allergen Reactions    Latex Rash    Ancef [Cefazolin] Shortness Of Breath     Nausea, itching, shortness of breath, flushing, reddness    Codeine Other (See Comments)     \"makes me catatonic\"     Social History     Socioeconomic History    Marital status:      Spouse name: Not on file    Number of children: Not on file    Years of education: Not on file    Highest education level: Not on file   Occupational History    Not on file previous scar over the affected area. There is not any cellulitis, lymphedema or cutaneous lesions noted in the lower extremities. Upon palpation there is no induration noted. Neurologic:  Gait: normal;  Motor exam of the lower extremities show ; quadriceps, hamstrings, foot dorsi and plantar flexors intact R.  5/5 and L. 5/5. Deep tendon reflexes are 2/4 at the knees and 2/4 at the ankles with strong extensor hallicus longus motor strength bilaterally. Sensory to both feet is intact to all sensory roots. Cardiovascular: The vascular exam is normal and is well perfused to distal extremities. Distal pulses DP/PT: R. 2+; L. 2+. There is cap refill noted less than two seconds in all digits. There is not edema of the bilateral lower extremities. There is not varicosities noted in the distal extremities. Lymph:  Upon palpation,  there is no lymphadenopathy noted in bilateral lower extremities. Musculoskeletal:  Gait: antalgic; examination of the nails and digits reveal no cyanosis or clubbing    Lumbar exam:  On visual inspection, there is no deformity of the spine. full range of motion, no tenderness, palpable spasm or pain on motion. Special tests: Straight Leg Raise negative, Vel testnegative. Hip exam:  Upon inspection, there is no deformity noted. Upon palpation there is not tenderness. ROM: is   full and semetrical.   Strength: Hip Flexors 5/5; Hip Abductors 5/5; Hip Adduction 5/5. Knee exam:  Right knee exam shows;  range of motion of R. Knee is 0 to 115, and L. Knee is 5 to 100. The patient does have  pain on motion, effusion is mild, there is tenderness over the  medial, lateral region, there are not any masses, there is not ligamentous instability, there is not  deformity noted. Knee exam: bilateral positive for moderate crepitations, some mild tenderness laxity is not noted with  stress.   There is not a popliteal cyst.     R. Knee:  Lachman's negative, Anterior Drawer negative, Posterior Drawer negative  Serenity's positive, Thallasy  positive,   PF grind test positive, Apprehension test negative, Patellar J sign  negative  L. Knee:  Lachman's negative, Anterior Drawer negative, Posterior Drawer negative  Serenity's positive, Thallasy  positive,   PF grind test positive, Apprehension test negative,  Patellar J sign  negative     Xray Exam:  Osteoarthritis which is at worst mild at the right knee and minimal at the   left knee. Radiographic findings reviewed with patient    Impression:  Encounter Diagnosis   Name Primary? Primary osteoarthritis of right knee Yes       Plan:   Natural history and expected course discussed. Questions answered. Educational materials distributed. Rest, ice, compression, and elevation (RICE) therapy. Reduction in offending activity. Patellar compression sleeve. The patient has failed conservative measures such as NSAIDS, HEP, and cortisone injections. She is an excellent candidate for Zilretta injections  in the Right knee.  We will contact the patient's insurance company and see them back in the office once we have received approval.

## 2022-09-29 ENCOUNTER — NURSE ONLY (OUTPATIENT)
Dept: ORTHOPEDIC SURGERY | Age: 62
End: 2022-09-29
Payer: COMMERCIAL

## 2022-09-29 DIAGNOSIS — M17.11 PRIMARY OSTEOARTHRITIS OF RIGHT KNEE: Primary | ICD-10-CM

## 2022-09-29 PROCEDURE — 99999 PR OFFICE/OUTPT VISIT,PROCEDURE ONLY: CPT | Performed by: NURSE PRACTITIONER

## 2022-09-29 PROCEDURE — 20610 DRAIN/INJ JOINT/BURSA W/O US: CPT | Performed by: NURSE PRACTITIONER

## 2022-09-30 NOTE — PROGRESS NOTES
Chief Complaint   Patient presents with    Injections     Right knee zilretta        I will proceed with a cortisone injection in the Right knee. Verbal and written consent was obtained for the injections. The skin was prepped with alcohol. A prepared mixture of 32 mg of Zilretta and 5mL diluent was injected to Right knee. The injection was given through the lateral side of the knee. The patient tolerated the injection well. I will see the patient back prn. Ralf Soto was seen today for injections.     Diagnoses and all orders for this visit:    Primary osteoarthritis of right knee  -     20610 - AR DRAIN/INJECT LARGE JOINT/BURSA    Other orders  -     triamcinolone acetonide (ZILRETTA) intra-articular injection 32 mg

## 2022-10-26 ENCOUNTER — TELEMEDICINE (OUTPATIENT)
Dept: SLEEP MEDICINE | Age: 62
End: 2022-10-26
Payer: COMMERCIAL

## 2022-10-26 DIAGNOSIS — I48.91 ATRIAL FIBRILLATION, UNSPECIFIED TYPE (HCC): ICD-10-CM

## 2022-10-26 DIAGNOSIS — G47.33 OSA (OBSTRUCTIVE SLEEP APNEA): Primary | ICD-10-CM

## 2022-10-26 DIAGNOSIS — G47.34 NOCTURNAL OXYGEN DESATURATION: ICD-10-CM

## 2022-10-26 DIAGNOSIS — E66.9 OBESITY, UNSPECIFIED CLASSIFICATION, UNSPECIFIED OBESITY TYPE, UNSPECIFIED WHETHER SERIOUS COMORBIDITY PRESENT: ICD-10-CM

## 2022-10-26 PROCEDURE — 99214 OFFICE O/P EST MOD 30 MIN: CPT | Performed by: NURSE PRACTITIONER

## 2022-10-26 NOTE — PROGRESS NOTES
Telemedicine    Governor Bud is a 57 y.o. female  being evaluated by a Virtual Visit (video visit) encounter to address concerns as mentioned below. A caregiver was present when appropriate. Due to this being a TeleHealth encounter (During RDRXB-75 public health emergency), evaluation of the following organ systems was limited: Vitals/Constitutional/EENT/Resp/CV/GI//MS/Neuro/Skin/Heme-Lymph-Imm. Pursuant to the emergency declaration under the [facility] waiver authority and the [employer] [facility] [employer] 8998-33O, this Virtual Visit was conducted with patient's (and/or legal guardian's) consent, to reduce the patient's risk of exposure to COVID-19 and provide necessary medical care. The patient (and/or legal guardian) has also been advised to contact this office for worsening conditions or problems, and seek emergency medical treatment and/or call 911 if deemed necessary. The patient (and/or legal guardian if applicable) is aware that this is a billable service, which includes applicable co-pays. This virtual visit was conducted with patient's (and/or legal guardian's) consent. Services were provided through a video synchronous discussion virtually to substitute for in-person clinic visit. Patient and provider were both  located remotely. Patient identification was confirmed by 2 forms of personal forms of identification (Birthday and Patient's address). The patient was located in a state where the provider was licensed to provide care. YOB: 1960  Address: [address] [place] [address],[address] were provided through a video synchronous discussion virtually to substitute for in-person clinic visit. An electronic signature was used to authenticate this note.   Start time: 11:58 am  End time: 12:12 pm      [facility] Sleep Medicine    Patient Name: Derek Connors  Age: 57 y.o.   : 1960    Date of Visit: 10/26/22        Review of Last Visit Summary:    The patient was last seen on 2022 for  Obstructive Sleep Apnea. Interim History:     Derek Connors is a 57 y.o. female that  has a past medical history of Arthritis, Constipation, Diabetes mellitus ([address] Difficult intubation (), Headache, History of hernia repair, OTHER MEDICAL, Hypertension, Neck pain, Numbness, Obesities, morbid ([address] Other abnormal clinical finding (dec 2014 ), Pain, Pneumonia (4-5 yrs ago also 2014), Postmenopausal bleeding (2015), Prolonged emergence from general anesthesia, and Thyroid disease. She presents as follow up to Sleep Clinic to review Bilevel compliance. Interval Events:    -Did not end up using her bilevel frequently since last visit because of the continue difficulty with mask fit due to sweating.   -Recently diagnosed with atrial fibrillation when he was in the hospital for surgical procedure.  -Following with cardiology in .  -Still has a nasal face mask and chin strap. Will attempt to use these in place of full face mask. DME [facility]     Sleep Study History: 2022-split-night sleep study@ [facility], weight 316 pounds, pretreatment sleep efficiency 52%, sleep efficiency after initiating positive airway pressure was 49%, pretreatment AHI 30.7, RDI 30.7, slept supine with head of bed elevated for entire study, SPO2 tanesha 66%(occurred during REM sleep), T<88% was 92% of diagnostic portion of study---> titration using bilevel of 18/12 cm of water resulted in an AHI of 2.9, SPO2 tanesha of 77%, and average O2 sat of 89%. Sinus tachycardia noted.   Recommendation of bilevel PAP therapy at 20/16 cm of water with overnight oximetry to be performed following.    2015- Titration [facility] , sleep efficiency 91.6% REM 23.3%, Optimal treatment found using bilevel 20/16 cm H2O with residual AHI of 0, SPo2 tanesha of 90% and 9 min of REM    3/9/2015- [facility]- [facility], wt 333 lbs , sleep efficiency 84.9%, REM 23.2%, AHI 18.5, REM AHI 65, SUPINE AHI 19, SPO2 tanesha 64%, T<88% 14.4% of TST    Sleep History:    Past Medical History:  Past Medical History:   Diagnosis Date    Arthritis     left shoulder    Constipation     Diabetes mellitus ([address]     Difficult intubation     deviated Trachea    Headache     History of hernia repair     HX OTHER MEDICAL     hard to wake after anesth    Hypertension     Neck pain     Numbness     Obesities, morbid ([address]     Other abnormal clinical finding dec 2014     hospitalized [facility] pneumonia had ercp blood infection     Pain     Pneumonia 4-5 yrs ago also 2014    Postmenopausal bleeding 2015    Prolonged emergence from general anesthesia     Thyroid disease        Past Surgical History:        Procedure Laterality Date    ABDOMEN SURGERY  10/26/2012    hernia    ABDOMINAL WALL SURGERY  2012     Abdominal Wound Dehiscence Repair    APPENDECTOMY      CARPAL TUNNEL RELEASE       SECTION      x2    CHOLECYSTECTOMY      open    COLONOSCOPY      DILATION AND CURETTAGE OF UTERUS  2015    hysteroscopy    DILATION AND CURETTAGE OF UTERUS N/A 2021    DILATATION AND CURETTAGE HYSTEROSCOPY performed by Heidy Harrison MD at [address]    ERCP  dec 2014    Trupti Hernandez 19  2013    exploratory laparotomy, lysis of adhesions (85 mins), repair of ventral incisional hernia with biologic mesh (Dorion); bilateral myofascial release (Cash)    NECK SURGERY       stacie in neck    [place] COLSC FLX W/RMVL OF TUMOR POLYP LESION SNARE TQ  2013    [facility]    TONSILLECTOMY      UPPER GASTROINTESTINAL ENDOSCOPY         Allergies:  is allergic to latex, ancef [cefazolin], and codeine.   Social History:    Social History     Tobacco Use    Smoking status: Former     Packs/day: 1.00     Types: Cigarettes     Quit date: 10/7/2012     Years since quitting: 10.0    Smokeless tobacco: Never    Tobacco comments:     quit    Vaping Use    Vaping Use: Never used   Substance Use Topics    Alcohol use: No     Comment: 1 cup of coffee a day     Drug use: No        Family History:       Problem Relation Age of Onset    Cancer Father         bladder and colon    Heart Attack Brother          at 46    Cancer Brother         Bladder cancer Per patient    Uterine Cancer Mother         Per patient    Uterine Cancer Daughter         Per patient    Uterine Cancer Niece         Per patient       Current Medications:    Current Outpatient Medications:     Biotin 08501 MCG TABS, Take by mouth daily, Disp: , Rfl:     methocarbamol (ROBAXIN) 500 MG tablet, take 1 tablet twice a day if needed, Disp: , Rfl:     ibuprofen (ADVIL;MOTRIN) 600 MG tablet, Take 1 tablet by mouth once for 1 dose, Disp: 21 tablet, Rfl: 0    spironolactone (ALDACTONE) 25 MG tablet, Take 25 mg by mouth daily, Disp: , Rfl:     gabapentin (NEURONTIN) 300 MG capsule, take 2 capsules by mouth three times a day, Disp: , Rfl:     nystatin (MYCOSTATIN) POWD powder, Apply topically 2 times daily, Disp: , Rfl:     diclofenac (FLECTOR) 1.3 % PTCH patch, Place 1 patch onto the skin 2 times daily, Disp: , Rfl:     diclofenac sodium (VOLTAREN) 1 % GEL, Apply topically 2 times daily, Disp: , Rfl:     FreeStyle Lancets MISC, 1 each by Does not apply route daily, Disp: , Rfl:     blood glucose test strips (ASCENSIA AUTODISC VI;ONE TOUCH ULTRA TEST VI) strip, 1 each by In Vitro route daily As needed. , Disp: , Rfl:     metFORMIN (GLUCOPHAGE) 1000 MG tablet, Take 1,000 mg by mouth 2 times daily (with meals), Disp: , Rfl:     Dulaglutide 1.5 MG/0.5ML SOPN, Inject 0.5 mLs into the skin once a week , Disp: , Rfl:     tiZANidine (ZANAFLEX) 4 MG tablet, Take 4 mg by mouth every 8 hours as needed, Disp: , Rfl:     Topiramate (TOPAMAX PO), Take 100 mg by mouth 2 times daily , Disp: , Rfl:     levothyroxine (SYNTHROID) 75 MCG tablet, Take 75 mcg by mouth Daily , Disp: , Rfl:     metoprolol (TOPROL-XL) 25 MG XL tablet, Take 25 mg by mouth daily , Disp: , Rfl:     Sleep Medicine 7/27/2022 6/20/2022 2/2/2022   Sitting and reading 1 1 0   Watching TV 0 2 0   Sitting, inactive in a public place (e.g. a theatre or a meeting) 0 0 0   As a passenger in a car for an hour without a break 0 0 0   Lying down to rest in the afternoon when circumstances permit 3 0 0   Sitting and talking to someone 0 0 0   Sitting quietly after a lunch without alcohol 0 0 0   In a car, while stopped for a few minutes in traffic 0 0 0   [place] Sleepiness Score 4 3 0       Review of Systems:    Constitutional: no chills, no fever   Eyes: no blurred vision  Cardiovascular: no chest pain,   Respiratory: no cough, no shortness of breath   Gastrointestinal:  no nausea,  no vomiting, no diarrhea. Musculoskeletal: no arthralgias  Neurological:no dizziness,  no headache, no memory changes. Endocrine: No chills        Objective:   PHYSICAL EXAM:    [facility] 12/20/2014 Comment: irregular  bleeding    Physical exam:  Gen: No acute distress. BMI of There is no height or weight on file to calculate BMI. Neck: Trachea midline. No obvious mass. Neck circumference     Resp: Able to speak in full sentences. M/S: No cyanosis. No obvious joint deformity. Neuro: Awake. Alert. Moves all four extremities. Psych: Alert and oriented. No anxiety. Assessment:      Diagnoses and all orders for this visit:    HAYES (obstructive sleep apnea)    Nocturnal oxygen desaturation    Atrial fibrillation, unspecified type (HCC)    Obesity, unspecified classification, unspecified obesity type, unspecified whether serious comorbidity present     Plan:       1.   Severe obstructive Sleep Apnea     -Struggles with BiPAP due to difficulty with mask fit because of excessive sweating at night.  -We discussed several different mask options and she notes that she has a nasal mask at home with a medina. I did advise her to trial this mask.  -Discussed pathophysiology of HAYES and its impact on daily well-being, as well as cardiometabolic and neurocognitive health (particularly in moderate-severe cases). -Discussed bilevel as first-line and gold-standard therapy for HAYES. Patient understands that bilevel should be worn every night for the duration of the night (in order to not miss therapy during early-morning REM period) for maximum benefit.  -She is aware of the severity of her sleep apnea as well as nocturnal hypoxia and knows that this can have a profound effect on her cardiovascular health if left untreated. -In the past, have reached out to DME for mask fit but unfortunately she is at her limit of masks that insurance will pay for this year. Any other mask will need to be paid for out of pocket. -Discussed secondary treatment option of oxygen alone, patient prefers to trial Bilevel again before using oxygen.  -Continue current settings. 2. Nocturnal Oxygen Desaturation    -Noted on previous PSG- hypoxia not completely resolved with BiPAP use, lowest O2 sat was 77%, average was 89% with BiPAP use. -Once regular BiPAP usage and control of HAYES is seen using data download, will obtain overnight oximetry to ensure resolution of hypoxia/ qualify for oxygen.  -Discussed ordering O2 as secondary treatment, patient not interested at this time. 3. Atrial Fibrillation    -Newly diagnosed. -Following with cardiology. -Reviewed connection between untreated HAYES and a-fib. Regular Bilevel usage encouraged. 4. Obesity (There is no height or weight on file to calculate BMI.)     -Discussed impact of weight gain on HAYES severity. Patient understands that HAYES severity may improve with weight loss but no guarantee of cure can be made. Return in about 1 month (around 11/26/2022) for Follow up for sleep apnea.     [facility]  Sleep Medicine   [place] [id number] option 2  F- [phone]

## 2022-11-13 PROBLEM — Z01.84: Status: ACTIVE | Noted: 2022-11-13

## 2022-11-28 NOTE — PROGRESS NOTES
Telemedicine    Beth Sawyer is a 60 y.o. female being evaluated by a Virtual Visit (video visit) encounter to address concerns as mentioned below. A caregiver was present when appropriate. Due to this being a TeleHealth encounter (During [facility]-56 public health emergency), evaluation of the following organ systems was limited: Vitals/Constitutional/EENT/Resp/CV/GI//MS/Neuro/Skin/Heme-Lymph-Imm. Pursuant to the emergency declaration under the [facility], 1135 waiver authority and the [facility] [id number], this Virtual Visit was conducted with patient's (and/or legal guardian's) consent, to reduce the patient's risk of exposure to COVID-19 and provide necessary medical care. The patient (and/or legal guardian) has also been advised to contact this office for worsening conditions or problems, and seek emergency medical treatment and/or call 911 if deemed necessary. The patient (and/or legal guardian if applicable) is aware that this is a billable service, which includes applicable co-pays. This virtual visit was conducted with patient's (and/or legal guardian's) consent. Services were provided through a video synchronous discussion virtually to substitute for in-person clinic visit. Patient and provider were both  located remotely. Patient identification was confirmed by 2 forms of personal forms of identification (Birthday and Patient's address). The patient was located in a state where the provider was licensed to provide care. YOB: 1960  Address: [address] [place] [address],[address] were provided through a video synchronous discussion virtually to substitute for in-person clinic visit. An electronic signature was used to authenticate this note.   Start time: 10:40 am  End time: 10:54 am      [facility] Sleep Medicine    Patient Name: Ashley Latif  Age: 60 y.o.   : 1960    Date of Visit: 22        Review of Last Visit Summary:    The patient was last seen on 2/3/2022 for  Obstructive Sleep Apnea. Interim History:     Ashley Latif is a 60 y.o. female that  has a past medical history of Arthritis, Constipation, Diabetes mellitus ([address] Difficult intubation (), Headache, History of hernia repair, OTHER MEDICAL, Hypertension, Neck pain, Numbness, Obesities, morbid ([address] Other abnormal clinical finding (dec 2014 ), Pain, Pneumonia (4-5 yrs ago also 2014), Postmenopausal bleeding (2015), Prolonged emergence from general anesthesia, and Thyroid disease. She presents in follow up to Sleep Clinic to review CPAP adherence and efficacy. Interval Events:    -Previous Bilevel device was revoked by [employer] due to noncompliance. Patient had difficulty with tolerating mask and hence she was unable to use, but is very motivated to try again with a nasal mask and understands the severity of her HAYES. -Sleeping elevated, with three pillows, fatigue through the day, but she is currently fighting a respiratory cold. -Willing to repeat home sleep study to restart trial. During titration portion of previous sleep study from 22, because of the persistence of respiratory events and hypoxia, particularly in REM sleep using CPAP, titration with bi-level PAP was used. She will need bilevel PAP ordered following HST due to CPAP intolerance.     Sleep Study History: 2022-split-night sleep study@ [facility], weight 316 pounds, pretreatment sleep efficiency 52%, sleep efficiency after initiating positive airway pressure was 49%, pretreatment AHI 30.7, RDI 30.7, slept supine with head of bed elevated for entire study, SPO2 tanesha 66%(occurred during REM sleep), T<88% was 92% of diagnostic portion of study---> titration using bilevel of 18/12 cm of water resulted in an AHI of 2.9, SPO2 tanesha of 77%, and average O2 sat of 89%. Sinus tachycardia noted.   Recommendation of bilevel PAP therapy at 20/16 cm of water with overnight oximetry to be performed following.    2015- Titration [facility] , sleep efficiency 91.6% REM 23.3%, Optimal treatment found using bilevel 20/16 cm H2O with residual AHI of 0, SPo2 tanesha of 90% and 9 min of REM    3/9/2015- PSG- [facility], wt 333 lbs , sleep efficiency 84.9%, REM 23.2%, AHI 18.5, REM AHI 65, SUPINE AHI 19, SPO2 tanesha 64%, T<88% 14.4% of TST    Past Medical History:  Past Medical History:   Diagnosis Date    Arthritis     left shoulder    Constipation     Diabetes mellitus ([address]     Difficult intubation     deviated Trachea    Headache     History of hernia repair     HX OTHER MEDICAL     hard to wake after anesth    Hypertension     Neck pain     Numbness     Obesities, morbid ([address]     Other abnormal clinical finding dec 2014     hospitalized [facility] pneumonia had ercp blood infection     Pain     Pneumonia 4-5 yrs ago also 2014    Postmenopausal bleeding 2015    Prolonged emergence from general anesthesia     Thyroid disease        Past Surgical History:        Procedure Laterality Date    ABDOMEN SURGERY  10/26/2012    hernia    ABDOMINAL WALL SURGERY  2012     Abdominal Wound Dehiscence Repair    APPENDECTOMY      CARPAL TUNNEL RELEASE       SECTION      x2    CHOLECYSTECTOMY      open    COLONOSCOPY      DILATION AND CURETTAGE OF UTERUS  2015    hysteroscopy    DILATION AND CURETTAGE OF UTERUS N/A 2021    DILATATION AND CURETTAGE HYSTEROSCOPY performed by Gena Baker MD at [address]    ERCP  dec 2014    [address]  2013    exploratory laparotomy, lysis of adhesions (85 mins), repair of ventral incisional hernia with biologic mesh ([facility]); bilateral myofascial release ([facility])    NECK SURGERY  2005     stacie in neck    [place] COL[place] FLX W/RMVL OF TUMOR POLYP LESION SNARE TQ 2013    Dr. Chantal Scheuermann  e s    TONSILLECTOMY      UPPER GASTROINTESTINAL ENDOSCOPY         Allergies:  is allergic to latex, ancef [cefazolin], and codeine. Social History:    Social History     Tobacco Use    Smoking status: Former     Packs/day: 1.00     Types: Cigarettes     Quit date: 10/7/2012     Years since quitting: 10.1    Smokeless tobacco: Never    Tobacco comments:     quit    Vaping Use    Vaping Use: Never used   Substance Use Topics    Alcohol use: No     Comment: 1 cup of coffee a day     Drug use: No        Family History:       Problem Relation Age of Onset    Cancer Father         bladder and colon    Heart Attack Brother          at 46    Cancer Brother         Bladder cancer Per patient    Uterine Cancer Mother         Per patient    Uterine Cancer Daughter         Per patient    Uterine Cancer Niece         Per patient       Current Medications:    Current Outpatient Medications:     pantoprazole (PROTONIX) 40 MG tablet, Take 40 mg by mouth daily, Disp: , Rfl:     Biotin 45067 MCG TABS, Take by mouth daily, Disp: , Rfl:     methocarbamol (ROBAXIN) 500 MG tablet, take 1 tablet twice a day if needed, Disp: , Rfl:     spironolactone (ALDACTONE) 25 MG tablet, Take 25 mg by mouth daily, Disp: , Rfl:     gabapentin (NEURONTIN) 300 MG capsule, take 2 capsules by mouth three times a day, Disp: , Rfl:     nystatin (MYCOSTATIN) POWD powder, Apply topically 2 times daily, Disp: , Rfl:     diclofenac (FLECTOR) 1.3 % PTCH patch, Place 1 patch onto the skin 2 times daily, Disp: , Rfl:     diclofenac sodium (VOLTAREN) 1 % GEL, Apply topically 2 times daily, Disp: , Rfl:     FreeStyle Lancets MISC, 1 each by Does not apply route daily, Disp: , Rfl:     blood glucose test strips (ASCENSIA AUTODISC VI;ONE TOUCH ULTRA TEST VI) strip, 1 each by In Vitro route daily As needed. , Disp: , Rfl:     metFORMIN (GLUCOPHAGE) 1000 MG tablet, Take 1,000 mg by mouth 2 times daily (with meals), Disp: , Rfl: Dulaglutide 1.5 MG/0.5ML SOPN, Inject 0.5 mLs into the skin once a week , Disp: , Rfl:     Topiramate (TOPAMAX PO), Take 100 mg by mouth 2 times daily , Disp: , Rfl:     levothyroxine (SYNTHROID) 75 MCG tablet, Take 75 mcg by mouth Daily , Disp: , Rfl:     metoprolol (TOPROL-XL) 25 MG XL tablet, Take 50 mg by mouth daily, Disp: , Rfl:     tiZANidine (ZANAFLEX) 4 MG tablet, Take 4 mg by mouth every 8 hours as needed (Patient not taking: Reported on 11/30/2022), Disp: , Rfl:     Sleep Medicine 11/30/2022 7/27/2022 6/20/2022 2/2/2022   Sitting and reading 3 1 1 0   Watching TV 3 0 2 0   Sitting, inactive in a public place (e.g. a theatre or a meeting) 0 0 0 0   As a passenger in a car for an hour without a break 0 0 0 0   Lying down to rest in the afternoon when circumstances permit 3 3 0 0   Sitting and talking to someone 0 0 0 0   Sitting quietly after a lunch without alcohol 3 0 0 0   In a car, while stopped for a few minutes in traffic 0 0 0 0   [place] Sleepiness Score 12 4 3 0       Review of Systems:    Constitutional: no chills, no fever   Eyes: no blurred vision   Cardiovascular: no chest pain,   Respiratory: no cough, no shortness of breath   Gastrointestinal:  no nausea,  no vomiting, no diarrhea. Musculoskeletal: no arthralgias, no back pain   Neurological:  no dizziness,  no headache, no memory changes. Endocrine: No chills    Objective:   PHYSICAL EXAM:    [facility] 12/20/2014 Comment: irregular  bleeding    Physical exam:  Gen: No acute distress. BMI of There is no height or weight on file to calculate BMI. Neck: Trachea midline. No obvious mass. Neck circumference     Resp: Able to speak in full sentences  Neuro: Awake. Alert. Moves all four extremities. Psych: Alert and oriented. No anxiety. Assessment:      Justo Abdalla was seen today for sleep apnea. Diagnoses and all orders for this visit:    Obstructive sleep apnea  -     Home Sleep Study;  Future    Atrial fibrillation, unspecified type [address]    Obesity, unspecified classification, unspecified obesity type, unspecified whether serious comorbidity present    Nocturnal oxygen desaturation     Plan:     Kamran Thomas is a 60 y.o. female that  has a past medical history of Arthritis, Constipation, Diabetes mellitus ([address] Difficult intubation (2017), Headache, History of hernia repair, OTHER MEDICAL, Hypertension, Neck pain, Numbness, Obesities, morbid ([address] Other abnormal clinical finding (dec 2014 ), Pain, Pneumonia (4-5 yrs ago also 12/2014), Postmenopausal bleeding (1/26/2015), Prolonged emergence from general anesthesia, and Thyroid disease. Unfortunately, bilevel device was revoked by insurance due to noncompliance. Given the severity of her HAYES, nocturnal hypoxia, and symptoms, patient willing to proceed with retrial of PAP therapy (specifically bilevel, as her HAYES was not controlled using CPAP from previous titration study). She is not a candidate Inspire therapy and due to severity of disease, oral appliance not the best option. Willing to proceed with PAP therapy. Severe Obstructive Sleep Apnea    -See above. Home sleep study ordered for [facility]. -Previously discussed pathophysiology of HAYES and its impact on daily well-being, as well as cardiometabolic and neurocognitive health (particularly in moderate-severe cases). -Reviewed insurance compliance requirements tot be compliance with PAP therapy. Will order bilevel following home study, as CPAP failed in titration portion.  -Patient understands that CPAP should be worn every night for the duration of the night (in order to not miss therapy during early-morning REM period) for maximum benefit.   -Counseled on risks of driving while drowsy. Recommended a short, 10-15 min power nap (in a safe location, with car doors locked) as most effective tool if experiencing drowsiness while driving.  -Sleep elevated.     2. Nocturnal Oxygen Desaturation    -Noted on previous PSG- hypoxia not completely resolved with BiPAP use, lowest O2 sat was 77%, average was 89% with BiPAP use. -Once regular BiPAP usage and control of HAYES is seen using data download, will obtain overnight oximetry to ensure resolution of hypoxia/ qualify for oxygen.  -Discussed ordering O2 as secondary treatment, patient not interested at this time. Also consider pulmonary referral in the future- offered referral, patient prefers to wait until after PAP therapy is initiated. 3. Atrial Fibrillation    -Newly diagnosed. -Following with cardiology. -Reviewed connection between untreated HAYES and a-fib. Regular Bilevel usage encouraged. 4. Obesity. There is no height or weight on file to calculate BMI. -Discussed impact of weight gain on HAYES severity. Patient understands that HAYES severity may improve with weight loss but no guarantee of cure can be made. Return in about 4 months (around 3/30/2023) for biPAP compliance, Follow up for sleep apnea.     Eugene Murillo, APRN-[facility]  [address] [facility]  P -[phone] option 2  N- [phone]

## 2022-11-30 ENCOUNTER — TELEMEDICINE (OUTPATIENT)
Dept: SLEEP CENTER | Age: 62
End: 2022-11-30
Payer: COMMERCIAL

## 2022-11-30 DIAGNOSIS — E66.9 OBESITY, UNSPECIFIED CLASSIFICATION, UNSPECIFIED OBESITY TYPE, UNSPECIFIED WHETHER SERIOUS COMORBIDITY PRESENT: ICD-10-CM

## 2022-11-30 DIAGNOSIS — G47.33 OBSTRUCTIVE SLEEP APNEA: Primary | ICD-10-CM

## 2022-11-30 DIAGNOSIS — I48.91 ATRIAL FIBRILLATION, UNSPECIFIED TYPE (HCC): ICD-10-CM

## 2022-11-30 DIAGNOSIS — G47.34 NOCTURNAL OXYGEN DESATURATION: ICD-10-CM

## 2022-11-30 PROCEDURE — G8427 DOCREV CUR MEDS BY ELIG CLIN: HCPCS | Performed by: NURSE PRACTITIONER

## 2022-11-30 PROCEDURE — 99213 OFFICE O/P EST LOW 20 MIN: CPT | Performed by: NURSE PRACTITIONER

## 2022-11-30 PROCEDURE — 3017F COLORECTAL CA SCREEN DOC REV: CPT | Performed by: NURSE PRACTITIONER

## 2022-11-30 RX ORDER — PANTOPRAZOLE SODIUM 40 MG/1
40 TABLET, DELAYED RELEASE ORAL DAILY
COMMUNITY

## 2022-11-30 ASSESSMENT — SLEEP AND FATIGUE QUESTIONNAIRES
HOW LIKELY ARE YOU TO NOD OFF OR FALL ASLEEP WHILE SITTING INACTIVE IN A PUBLIC PLACE: 0
ESS TOTAL SCORE: 12
HOW LIKELY ARE YOU TO NOD OFF OR FALL ASLEEP WHILE LYING DOWN TO REST IN THE AFTERNOON WHEN CIRCUMSTANCES PERMIT: 3
HOW LIKELY ARE YOU TO NOD OFF OR FALL ASLEEP IN A CAR, WHILE STOPPED FOR A FEW MINUTES IN TRAFFIC: 0
HOW LIKELY ARE YOU TO NOD OFF OR FALL ASLEEP WHILE SITTING AND TALKING TO SOMEONE: 0
HOW LIKELY ARE YOU TO NOD OFF OR FALL ASLEEP WHEN YOU ARE A PASSENGER IN A CAR FOR AN HOUR WITHOUT A BREAK: 0
HOW LIKELY ARE YOU TO NOD OFF OR FALL ASLEEP WHILE SITTING AND READING: 3
HOW LIKELY ARE YOU TO NOD OFF OR FALL ASLEEP WHILE SITTING QUIETLY AFTER LUNCH WITHOUT ALCOHOL: 3
HOW LIKELY ARE YOU TO NOD OFF OR FALL ASLEEP WHILE WATCHING TV: 3

## 2022-12-07 ENCOUNTER — TELEPHONE (OUTPATIENT)
Dept: SLEEP CENTER | Age: 62
End: 2022-12-07

## 2022-12-13 ENCOUNTER — HOSPITAL ENCOUNTER (OUTPATIENT)
Dept: SLEEP CENTER | Age: 62
Discharge: HOME | End: 2022-12-13
Payer: COMMERCIAL

## 2022-12-13 DIAGNOSIS — G47.33 OBSTRUCTIVE SLEEP APNEA: ICD-10-CM

## 2022-12-13 PROCEDURE — 95800 SLP STDY UNATTENDED: CPT

## 2022-12-14 ENCOUNTER — TELEPHONE (OUTPATIENT)
Dept: ORTHOPEDIC SURGERY | Age: 62
End: 2022-12-14

## 2022-12-14 NOTE — TELEPHONE ENCOUNTER
Patient is requesting bilateral knee zilretta injections. She wants to make sure she can get both knees done because last time it was only her right knee. Please schedule patient once approved. Patient is also asking how much weight Dr Michael Lott wants her to lose before being able to do knee replacements. Please contact patient to advise.

## 2023-01-09 ENCOUNTER — TELEPHONE (OUTPATIENT)
Dept: SLEEP CENTER | Age: 63
End: 2023-01-09

## 2023-01-09 DIAGNOSIS — G47.33 OBSTRUCTIVE SLEEP APNEA: Primary | ICD-10-CM

## 2023-01-09 NOTE — TELEPHONE ENCOUNTER
Call to pt discussed SS results and tx recommendation for bipap therapy. Pt agreeable. Also discussed compliance, mask exchange and f/u appt.  Preferred DME [facility]

## 2023-01-09 NOTE — PROGRESS NOTES
HST reviewed, consistent with mild HAYES. AUTO bilevel ordered due to previous CPAP intolerance. To be set up with DME and reminded of insurance requirements. Follow up 3/29/23.

## 2023-01-09 NOTE — PROGRESS NOTES
[address] [place]    SLEEP STUDY REPORT     PATIENT NAME: Aleksandra Lozano : 1960  MED REC NO:   [id number]                          ACCOUNT NO: [de-identified]                              PROVIDER:     Nini Newton MD     DATE OF STUDY: 2022     WATCHPAT HOME SLEEP STUDY REPORT     LOCATION:  [address] [place] [address]     REFERRING PROVIDER: CATE Abrams    AGE: 60 yrs       SEX: Female          HEIGHT: 5 ft   6 in         WEIGHT: 273 lbs          BMI: 43.9 kg/m2    NECK CIRCUMFERENCE: 19 in    Symptoms: Excessive daytime sleepiness, napping, witnessed apneas, restless legs, difficulty falling/returning to sleep. The [place] Sleepiness Scale was 7 out of 24 (scores above or equal to 10 are suggestive of hypersomnolence). Indication: To reestablish obstructive sleep apnea diagnosis for insurance purposes (PAP device removed for noncompliance). Medical History: Morbid obesity, atrial fibrillation, hypertension, diabetes, hypothyroidism, GERD, chronic pain, and known severe obstructive sleep apnea with profound hypoxia (split-night PSG 2022 with AHI 30, T88 92% of diagnostic portion, prescribed bilevel PAP 20/16 cm of water but demonstrated suboptimal compliance). Medications: Gabapentin, metformin, levothyroxine, metoprolol, spironolactone, nystatin, Trulicity, pantoprazole, topiramate, oxycodone, acetaminophen, diclofenac, methocarbamol, multivitamin. DESCRIPTION: Unattended, full night Home Sleep Apnea testing (HSAT) was performed using an FDA approved  WatchPAT device. Peripheral Arterial Tonometry, pulse rate, oxygen saturation, total sleep time, sleep staging, body  position, and snoring were recorded. Reported pRDI/pAHI calculations in this report are scored based on 4%  desaturations according to AASM scoring criteria.  Recording started at 12:09:17 AM and ended at 9:04:59 AM. Of  the 8 hrs, 55 min of recording time, the patient was asleep for 8 hrs, 10 min. Respiratory indices were calculated  using the technically valid sleep time of 5 hrs, 21 min. REM sleep comprised 29.2% of the total sleep time. This study was considered to be technically adequate. FINDINGS:  RESPIRATORY MONITORING: The apnea/hypopnea index (pAHI) was 11.4. The respiratory disturbance index  (pRDI) was 11.9. The REM pRDI was 34.1 and the non-REM pRDI was 1.1. The patient slept in the supine position  for the entire study. The 4% oxygen desaturation index (VIANEY) was 11.2. The average oxygen saturation was 93%. The lowest oxygen saturation was 75%. Oxygen saturations were less than or equal to 88% for 22.2 minutes or   4.5% of the total oxygen saturation evaluation period. Snoring occurred for 3.2 minutes, or 0.7% of the total valid sleep time. PULSE: The average heart rate during recording was 69 beats per minute. IMPRESSION:   1. This study is consistent with mild sleep disordered breathing that worsens in REM sleep. Of note, the severity of this patient's sleep disordered breathing was likely underestimated as home sleep studies are inherently less sensitive. RECOMMENDATIONS:    1.  Based on the patient's previous in lab titration, auto bilevel therapy is recommended and will be ordered by the referring provider. Equipment ordering information is below. 2.  Per insurance requirements, the patient should be seen in clinical follow up within 3 months of receiving auto-biPAP therapy in order to document adherence to therapy, assess efficacy of the prescribed settings (as based upon a residual AHI of less than or equal to 5 on the device's remote download), and evaluate resolution of sleep-related complaints. 3.  The patient should be strongly counseled against driving while drowsy.   4.  Weight loss efforts should be encouraged, as the severity of obstructive sleep apnea may improve although no guarantee of cure can be made. EQUIPMENT ORDERING INFORMATION:  DEVICE:  Auto-bilevel PAP with heated humidification and a remote modem. SETTINGS:  Maximum IPAP 20 cm of water, minimum EPAP 8 cm of water, pressure support 4. MASK TYPE:  Full-face mask, or alternative as chosen by patient. MASK SIZE:  To be fit by DME. SUPPLEMENTAL OXYGEN:  None.     Abbey Estrella MD

## 2023-03-29 DIAGNOSIS — G47.33 OBSTRUCTIVE SLEEP APNEA: Primary | ICD-10-CM

## 2023-03-29 NOTE — PROGRESS NOTES
Patient needs titration study per insurance guidelines to receive new PAP. Order placed for Mateusz.

## 2023-04-03 ENCOUNTER — TELEPHONE (OUTPATIENT)
Dept: SLEEP CENTER | Age: 63
End: 2023-04-03

## 2023-04-11 NOTE — TELEPHONE ENCOUNTER
Called and left message for patient to schedule consult with Dr. Ritu Castellanos. Will follow up.     Electronically signed by Deepak Pappas MA on 1/26/2021 at 9:21 AM
Patient returned call, please call to schedule.
no

## 2023-07-19 ENCOUNTER — TELEPHONE (OUTPATIENT)
Dept: SLEEP CENTER | Age: 63
End: 2023-07-19

## 2023-07-20 ENCOUNTER — TELEPHONE (OUTPATIENT)
Dept: SLEEP CENTER | Age: 63
End: 2023-07-20

## 2023-08-04 ENCOUNTER — HOSPITAL ENCOUNTER (EMERGENCY)
Age: 63
Discharge: HOME | End: 2023-08-04
Payer: COMMERCIAL

## 2023-08-04 ENCOUNTER — APPOINTMENT (OUTPATIENT)
Dept: GENERAL RADIOLOGY | Age: 63
End: 2023-08-04
Payer: COMMERCIAL

## 2023-08-04 VITALS
SYSTOLIC BLOOD PRESSURE: 133 MMHG | WEIGHT: 290 LBS | TEMPERATURE: 97.8 F | HEART RATE: 82 BPM | OXYGEN SATURATION: 96 % | BODY MASS INDEX: 46.81 KG/M2 | RESPIRATION RATE: 18 BRPM | DIASTOLIC BLOOD PRESSURE: 76 MMHG

## 2023-08-04 DIAGNOSIS — M25.512 ACUTE PAIN OF LEFT SHOULDER: Primary | ICD-10-CM

## 2023-08-04 DIAGNOSIS — M62.838 SPASM OF MUSCLE: ICD-10-CM

## 2023-08-04 PROCEDURE — 6370000000 HC RX 637 (ALT 250 FOR IP): Performed by: PHYSICIAN ASSISTANT

## 2023-08-04 PROCEDURE — 6360000002 HC RX W HCPCS: Performed by: PHYSICIAN ASSISTANT

## 2023-08-04 PROCEDURE — 71046 X-RAY EXAM CHEST 2 VIEWS: CPT

## 2023-08-04 PROCEDURE — 99284 EMERGENCY DEPT VISIT MOD MDM: CPT

## 2023-08-04 PROCEDURE — 96372 THER/PROPH/DIAG INJ SC/IM: CPT

## 2023-08-04 PROCEDURE — 73030 X-RAY EXAM OF SHOULDER: CPT

## 2023-08-04 RX ORDER — DEXAMETHASONE SODIUM PHOSPHATE 10 MG/ML
10 INJECTION INTRAMUSCULAR; INTRAVENOUS ONCE
Status: COMPLETED | OUTPATIENT
Start: 2023-08-04 | End: 2023-08-04

## 2023-08-04 RX ORDER — KETOROLAC TROMETHAMINE 30 MG/ML
30 INJECTION, SOLUTION INTRAMUSCULAR; INTRAVENOUS ONCE
Status: COMPLETED | OUTPATIENT
Start: 2023-08-04 | End: 2023-08-04

## 2023-08-04 RX ORDER — IBUPROFEN 600 MG/1
600 TABLET ORAL 3 TIMES DAILY PRN
Qty: 30 TABLET | Refills: 0 | Status: SHIPPED | OUTPATIENT
Start: 2023-08-04

## 2023-08-04 RX ORDER — CYCLOBENZAPRINE HCL 10 MG
10 TABLET ORAL 3 TIMES DAILY PRN
Qty: 21 TABLET | Refills: 0 | Status: SHIPPED | OUTPATIENT
Start: 2023-08-04 | End: 2023-08-14

## 2023-08-04 RX ORDER — OXYCODONE HYDROCHLORIDE AND ACETAMINOPHEN 5; 325 MG/1; MG/1
1 TABLET ORAL ONCE
Status: COMPLETED | OUTPATIENT
Start: 2023-08-04 | End: 2023-08-04

## 2023-08-04 RX ADMIN — KETOROLAC TROMETHAMINE 30 MG: 30 INJECTION, SOLUTION INTRAMUSCULAR; INTRAVENOUS at 15:07

## 2023-08-04 RX ADMIN — DEXAMETHASONE SODIUM PHOSPHATE 10 MG: 10 INJECTION INTRAMUSCULAR; INTRAVENOUS at 15:07

## 2023-08-04 RX ADMIN — OXYCODONE AND ACETAMINOPHEN 1 TABLET: 5; 325 TABLET ORAL at 15:06

## 2023-08-04 ASSESSMENT — PAIN DESCRIPTION - ORIENTATION: ORIENTATION: LEFT

## 2023-08-04 ASSESSMENT — PAIN SCALES - GENERAL
PAINLEVEL_OUTOF10: 10

## 2023-08-04 ASSESSMENT — LIFESTYLE VARIABLES: HOW MANY STANDARD DRINKS CONTAINING ALCOHOL DO YOU HAVE ON A TYPICAL DAY: PATIENT DOES NOT DRINK

## 2023-08-04 ASSESSMENT — PAIN DESCRIPTION - PAIN TYPE: TYPE: ACUTE PAIN

## 2023-08-04 ASSESSMENT — PAIN - FUNCTIONAL ASSESSMENT: PAIN_FUNCTIONAL_ASSESSMENT: 0-10

## 2023-08-04 ASSESSMENT — PAIN DESCRIPTION - LOCATION: LOCATION: SHOULDER

## 2023-08-04 NOTE — ED PROVIDER NOTES
Independent [facility] Visit. [facility],[address]  ED  Encounter Note  Admit Date/RoomTime: 2023  2:18 PM  ED Room:   NAME: Norbert Farias  : 1960  MRN: [id number]  PCP: CATE Betancur - CNP    CHIEF COMPLAINT     Shoulder Pain (DENIES INJ STABBING INTO SHOULDER BLADE/ RADIATES DOWN ARM )    HISTORY OF PRESENT ILLNESS        Norbert Farias is a 60 y.o. female with a PMHx of DM who presents to the ED by private vehicle for right shoulder pain, beginning 12 hour(s) ago. The complaint has been persistent and is severe. It awoke her in the middle of the night. The pain is 10/10 and sharp in quality. She admits to numbness and tingling along the ulnar nerve down the left arm. She is unable to abduct or extend the left arm above the neck. She denies any chest pain, nausea, vomiting, or SOB. She took 1000 mg Tylenol in the morning without relief. She states that she has never experienced something like this before. REVIEW OF SYSTEMS     Pertinent positives and negatives are stated within HPI, all other systems reviewed and are negative. Past Medical History:  has a past medical history of Arthritis, Constipation, Diabetes mellitus ([address]), Difficult intubation, Headache, History of hernia repair, HX OTHER MEDICAL, Hypertension, Neck pain, Numbness, Obesities, morbid ([address]), Other abnormal clinical finding, Pain, Pneumonia, Postmenopausal bleeding, Prolonged emergence from general anesthesia, and Thyroid disease. Surgical History:  has a past surgical history that includes Tonsillectomy; Neck surgery ( );  section; Appendectomy; Abdominal wall surgery (2012); Carpal tunnel release; pr colsc flx w/rmvl of tumor polyp lesion snare tq (2013); Incisional hernia repair (2013); Colonoscopy; Upper gastrointestinal endoscopy; Cholecystectomy (); Abdomen surgery (10/26/2012); ERCP (dec 2014);  Dilation and curettage of uterus

## 2023-11-09 ENCOUNTER — TELEPHONE (OUTPATIENT)
Dept: SLEEP CENTER | Age: 63
End: 2023-11-09

## 2024-03-16 ENCOUNTER — HOSPITAL ENCOUNTER (OUTPATIENT)
Dept: GENERAL RADIOLOGY | Age: 64
Discharge: HOME | End: 2024-03-18

## 2024-03-16 ENCOUNTER — HOSPITAL ENCOUNTER (OUTPATIENT)
Age: 64
Discharge: HOME | End: 2024-03-18

## 2024-03-16 DIAGNOSIS — M25.521 RIGHT ELBOW PAIN: ICD-10-CM

## 2024-03-19 ENCOUNTER — HOSPITAL ENCOUNTER (OUTPATIENT)
Dept: GENERAL RADIOLOGY | Age: 64
Discharge: HOME | End: 2024-03-21
Payer: COMMERCIAL

## 2024-03-19 ENCOUNTER — HOSPITAL ENCOUNTER (OUTPATIENT)
Age: 64
Discharge: HOME | End: 2024-03-21
Payer: COMMERCIAL

## 2024-03-19 DIAGNOSIS — M25.522 LEFT ELBOW PAIN: ICD-10-CM

## 2024-03-19 PROCEDURE — 73070 X-RAY EXAM OF ELBOW: CPT

## 2024-04-22 ENCOUNTER — OFFICE VISIT (OUTPATIENT)
Dept: ORTHOPEDIC SURGERY | Facility: CLINIC | Age: 64
End: 2024-04-22
Payer: COMMERCIAL

## 2024-04-22 VITALS — WEIGHT: 292 LBS | BODY MASS INDEX: 46.93 KG/M2 | HEIGHT: 66 IN

## 2024-04-22 DIAGNOSIS — G56.22 CUBITAL TUNNEL SYNDROME ON LEFT: ICD-10-CM

## 2024-04-22 PROCEDURE — 1036F TOBACCO NON-USER: CPT | Performed by: ORTHOPAEDIC SURGERY

## 2024-04-22 PROCEDURE — 99204 OFFICE O/P NEW MOD 45 MIN: CPT | Performed by: ORTHOPAEDIC SURGERY

## 2024-04-22 RX ORDER — BLOOD SUGAR DIAGNOSTIC
STRIP MISCELLANEOUS
COMMUNITY
Start: 2024-04-01

## 2024-04-22 RX ORDER — PANTOPRAZOLE SODIUM 40 MG/1
40 TABLET, DELAYED RELEASE ORAL
COMMUNITY
Start: 2022-10-13

## 2024-04-22 RX ORDER — DICLOFENAC SODIUM 10 MG/G
GEL TOPICAL
COMMUNITY

## 2024-04-22 RX ORDER — LEVOTHYROXINE SODIUM 75 UG/1
75 TABLET ORAL DAILY
COMMUNITY

## 2024-04-22 RX ORDER — TOPIRAMATE 100 MG/1
100 TABLET, FILM COATED ORAL DAILY
COMMUNITY

## 2024-04-22 RX ORDER — ISOSORBIDE MONONITRATE 30 MG/1
30 TABLET, EXTENDED RELEASE ORAL
COMMUNITY

## 2024-04-22 RX ORDER — LANCETS 33 GAUGE
EACH MISCELLANEOUS
COMMUNITY
Start: 2023-08-21

## 2024-04-22 RX ORDER — ROPINIROLE 1 MG/1
1 TABLET, FILM COATED ORAL NIGHTLY
COMMUNITY

## 2024-04-22 RX ORDER — ROSUVASTATIN CALCIUM 20 MG/1
20 TABLET, COATED ORAL NIGHTLY
COMMUNITY
Start: 2023-05-04

## 2024-04-22 RX ORDER — SPIRONOLACTONE 25 MG/1
25 TABLET ORAL 2 TIMES DAILY
COMMUNITY

## 2024-04-22 RX ORDER — OXYCODONE AND ACETAMINOPHEN 5; 325 MG/1; MG/1
1 TABLET ORAL EVERY 8 HOURS PRN
COMMUNITY
Start: 2024-04-18

## 2024-04-22 RX ORDER — CYCLOBENZAPRINE HCL 10 MG
10 TABLET ORAL 2 TIMES DAILY PRN
COMMUNITY

## 2024-04-22 RX ORDER — NYSTATIN 100000 [USP'U]/G
POWDER TOPICAL
COMMUNITY

## 2024-04-22 RX ORDER — METFORMIN HYDROCHLORIDE 1000 MG/1
1000 TABLET ORAL
COMMUNITY

## 2024-04-22 RX ORDER — TRAZODONE HYDROCHLORIDE 150 MG/1
150 TABLET ORAL NIGHTLY
COMMUNITY

## 2024-04-22 RX ORDER — METOPROLOL SUCCINATE 50 MG/1
50 TABLET, EXTENDED RELEASE ORAL DAILY
COMMUNITY

## 2024-04-22 RX ORDER — GABAPENTIN 800 MG/1
800 TABLET ORAL 3 TIMES DAILY
COMMUNITY
Start: 2024-04-12

## 2024-04-22 RX ORDER — AMPICILLIN TRIHYDRATE 500 MG
25 CAPSULE ORAL DAILY
COMMUNITY
Start: 2024-03-22

## 2024-04-22 NOTE — PROGRESS NOTES
63 y.o. female presents today for evaluation of left hand pain, long finger coolness, and pain that radiates up and down the arm. The patient reports symptoms for about 2 months, getting worse over the past month or so.  Pain is controlled.  Reports no previous surgeries, injections or trauma to the area.  Reports pain worse with use, better at rest.  Pain numb and tingly, bothers her more when she sleeps and she tries to keep it straight.    Review of Systems    Constitutional: see HPI, no fever, no chills, not feeling tired, no significant weight gain or weight loss.   Eyes: No vision changes  ENT: no nosebleeds.   Cardiovascular: no chest pain.   Respiratory: no shortness of breath and no cough.   Gastrointestinal: no abdominal pain, no nausea, no vomiting and no diarrhea.   Musculoskeletal: per HPI  Neurological: no headache, no gait disturbances  Psychiatric: no depression and no sleep disturbances.   Endocrine: no muscle weakness and no muscle cramps.   Hematologic/Lymphatic: no swollen glands and no tendency for easy bruising or excessive swelling.     Patient's past medical history, past surgical history, allergies, and medications have been reviewed unless otherwise noted in the chart.     Cubital Tunnel Exam  Inspection:  no evidence of infection, no edema, no erythema, no ecchymosis, Palpation:  compartments soft, no pain with palpation, Range of Motion:  full elbow range of motion, Stability:  no elbow instability noted, Strength:  5/5 intrinsic, 5/5 elbow flexion/extension, Skin:  intact, Vascular:  capillary refill <2 seconds distally, Sensation:  decreased in the ulnar nerve distribution, Test:  positive Tinel's at the Cubital Tunnel, positive elbow flexion test.     Constitutional   General appearance: Alert and in no acute distress. Well developed, well nourished.    Eyes   External Eye, Conjunctiva and lids: Normal external exam - pupils were equal in size, round, reactive to light (PERRL) with  normal accommodation and extraocular movements intact (EOMI).   Ears, Nose, Mouth, and Throat   Hearing: Normal.   Neck   Neck: No neck mass was observed. Supple.   Pulmonary   Respiratory effort: No respiratory distress.   Cardiovascular   Examination of extremities: No peripheral edema.   Psychiatric   Judgment and insight: Intact.   Orientation to person, place, and time: Alert and oriented x 3.       Mood and affect: Normal.      Left cubital tunnel syndrome  Based on the history, physical exam and imaging studies above, the patient's presentation is consistent with the above diagnosis.  I had a long discussion with the patient regarding their presentation and the treatment options.  We discussed initial nonoperative versus operative management options as well as potential further diagnostic imaging.  We again discussed her treatment options going forward along with their associated risks and benefits. After thorough discussion, the patient has elected to proceed with conservative management. All questions were answered to the patients satisfaction who seems satisfied with the plan.  They will call the office with any questions/concerns.    Night splint  EMG  Follow-up after EMG

## 2024-04-22 NOTE — PROGRESS NOTES
Patient is having left elbow and wrist pain which is traveling into her shoulder. Goes to the gym 3-5 days a week for the past few months and has not changed anything else. Patient has been having pain for 3-4 weeks and is getting worse.

## 2024-05-16 ENCOUNTER — APPOINTMENT (OUTPATIENT)
Dept: NEUROLOGY | Facility: CLINIC | Age: 64
End: 2024-05-16
Payer: COMMERCIAL

## 2024-05-22 ENCOUNTER — HOSPITAL ENCOUNTER (OUTPATIENT)
Dept: NEUROLOGY | Facility: CLINIC | Age: 64
Discharge: HOME | End: 2024-05-22
Payer: COMMERCIAL

## 2024-05-22 DIAGNOSIS — G56.22 CUBITAL TUNNEL SYNDROME ON LEFT: ICD-10-CM

## 2024-05-22 PROCEDURE — 95911 NRV CNDJ TEST 9-10 STUDIES: CPT | Performed by: PSYCHIATRY & NEUROLOGY

## 2024-05-22 PROCEDURE — 95886 MUSC TEST DONE W/N TEST COMP: CPT | Performed by: PSYCHIATRY & NEUROLOGY

## 2024-05-30 ENCOUNTER — OFFICE VISIT (OUTPATIENT)
Dept: ORTHOPEDIC SURGERY | Facility: CLINIC | Age: 64
End: 2024-05-30
Payer: COMMERCIAL

## 2024-05-30 VITALS — HEIGHT: 66 IN | WEIGHT: 288 LBS | BODY MASS INDEX: 46.28 KG/M2

## 2024-05-30 DIAGNOSIS — M54.12 CERVICAL RADICULOPATHY: ICD-10-CM

## 2024-05-30 DIAGNOSIS — G56.22 CUBITAL TUNNEL SYNDROME ON LEFT: ICD-10-CM

## 2024-05-30 DIAGNOSIS — G56.22 CUBITAL TUNNEL SYNDROME ON LEFT: Primary | ICD-10-CM

## 2024-05-30 PROCEDURE — 99214 OFFICE O/P EST MOD 30 MIN: CPT | Performed by: ORTHOPAEDIC SURGERY

## 2024-05-30 ASSESSMENT — PAIN - FUNCTIONAL ASSESSMENT: PAIN_FUNCTIONAL_ASSESSMENT: 0-10

## 2024-05-30 ASSESSMENT — PAIN SCALES - GENERAL: PAINLEVEL_OUTOF10: 5 - MODERATE PAIN

## 2024-05-30 NOTE — PROGRESS NOTES
63 y.o. female presents today for follow up of left hand pain, long finger coolness, and pain that radiates up and down the arm. The patient reports symptoms for about 3 months, getting worse over the past month or so.  Pain is controlled.  Reports no previous surgeries, injections or trauma to the area.  Reports pain worse with use, better at rest.  Pain numb and tingly, bothers her more when she sleeps and she tries to keep it straight.  She had prior C4-C6 fusions in the past, has seen spine since then and pain management which have helped somewhat with her symptoms.  Recently got neuromuscular ultrasound.  Did have bilateral carpal tunnel releases in the past.    Review of Systems    Constitutional: see HPI, no fever, no chills, not feeling tired, no significant weight gain or weight loss.   Eyes: No vision changes  ENT: no nosebleeds.   Cardiovascular: no chest pain.   Respiratory: no shortness of breath and no cough.   Gastrointestinal: no abdominal pain, no nausea, no vomiting and no diarrhea.   Musculoskeletal: per HPI  Neurological: no headache, no gait disturbances  Psychiatric: no depression and no sleep disturbances.   Endocrine: no muscle weakness and no muscle cramps.   Hematologic/Lymphatic: no swollen glands and no tendency for easy bruising or excessive swelling.     Patient's past medical history, past surgical history, allergies, and medications have been reviewed unless otherwise noted in the chart.     Cubital Tunnel Exam  Inspection:  no evidence of infection, no edema, no erythema, no ecchymosis, Palpation:  compartments soft, no pain with palpation, Range of Motion:  full elbow range of motion, Stability:  no elbow instability noted, Strength:  5/5 intrinsic, 5/5 elbow flexion/extension, Skin:  intact, Vascular:  capillary refill <2 seconds distally, Sensation:  decreased in the ulnar nerve distribution, Test:  positive Tinel's at the Cubital Tunnel, positive elbow flexion test.      Constitutional   General appearance: Alert and in no acute distress. Well developed, well nourished.    Eyes   External Eye, Conjunctiva and lids: Normal external exam - pupils were equal in size, round, reactive to light (PERRL) with normal accommodation and extraocular movements intact (EOMI).   Ears, Nose, Mouth, and Throat   Hearing: Normal.   Neck   Neck: No neck mass was observed. Supple.   Pulmonary   Respiratory effort: No respiratory distress.   Cardiovascular   Examination of extremities: No peripheral edema.   Psychiatric   Judgment and insight: Intact.   Orientation to person, place, and time: Alert and oriented x 3.       Mood and affect: Normal.      Left cubital tunnel syndrome  Based on the history, physical exam and imaging studies above, the patient's presentation is consistent with the above diagnosis.  I had a long discussion with the patient regarding their presentation and the treatment options.  We discussed initial nonoperative versus operative management options as well as potential further diagnostic imaging.  We again discussed her treatment options going forward along with their associated risks and benefits. After thorough discussion, the patient has elected to proceed with conservative management. All questions were answered to the patients satisfaction who seems satisfied with the plan.  They will call the office with any questions/concerns.    Night splint  NMUS  Refer to spine center  Follow-up after NMUS

## 2024-06-05 ENCOUNTER — EVALUATION (OUTPATIENT)
Dept: PHYSICAL THERAPY | Facility: HOSPITAL | Age: 64
End: 2024-06-05
Payer: COMMERCIAL

## 2024-06-05 DIAGNOSIS — M25.561 RIGHT KNEE PAIN: Primary | Chronic | ICD-10-CM

## 2024-06-05 DIAGNOSIS — M25.562 LEFT KNEE PAIN: Chronic | ICD-10-CM

## 2024-06-05 PROCEDURE — 97110 THERAPEUTIC EXERCISES: CPT | Mod: GP | Performed by: PHYSICAL THERAPIST

## 2024-06-05 PROCEDURE — 97162 PT EVAL MOD COMPLEX 30 MIN: CPT | Mod: GP | Performed by: PHYSICAL THERAPIST

## 2024-06-05 ASSESSMENT — COLUMBIA-SUICIDE SEVERITY RATING SCALE - C-SSRS
2. HAVE YOU ACTUALLY HAD ANY THOUGHTS OF KILLING YOURSELF?: NO
1. IN THE PAST MONTH, HAVE YOU WISHED YOU WERE DEAD OR WISHED YOU COULD GO TO SLEEP AND NOT WAKE UP?: NO
6. HAVE YOU EVER DONE ANYTHING, STARTED TO DO ANYTHING, OR PREPARED TO DO ANYTHING TO END YOUR LIFE?: NO

## 2024-06-05 ASSESSMENT — ENCOUNTER SYMPTOMS
LOSS OF SENSATION IN FEET: 1
OCCASIONAL FEELINGS OF UNSTEADINESS: 1
DEPRESSION: 0

## 2024-06-05 ASSESSMENT — PATIENT HEALTH QUESTIONNAIRE - PHQ9
2. FEELING DOWN, DEPRESSED OR HOPELESS: NOT AT ALL
1. LITTLE INTEREST OR PLEASURE IN DOING THINGS: NOT AT ALL
SUM OF ALL RESPONSES TO PHQ9 QUESTIONS 1 AND 2: 0

## 2024-06-05 ASSESSMENT — PAIN - FUNCTIONAL ASSESSMENT: PAIN_FUNCTIONAL_ASSESSMENT: 0-10

## 2024-06-05 ASSESSMENT — PAIN SCALES - GENERAL: PAINLEVEL_OUTOF10: 7

## 2024-06-05 NOTE — PROGRESS NOTES
Physical Therapy    Physical Therapy Lower Extremity Evaluation    Patient Name: Vivian Tavarez  MRN: [id number]  : 1960   Today's Date: 2024  Time Calculation  Start Time: 1515  Stop Time: 1610  Time Calculation (min): 55 min  PT Evaluation Time Entry  PT Evaluation (Moderate) Time Entry: 45  PT Therapeutic Procedures Time Entry  Therapeutic Exercise Time Entry: 10             Visit: 1  Auth: M25.561, M25.562  30v 6/3/24 to 24  [id number]  [id number]    Current Problem  Problem List Items Addressed This Visit             ICD-10-CM    Right knee pain - Primary M25.561    Relevant Orders    Follow Up In Physical Therapy    Left knee pain M25.562    Relevant Orders    Follow Up In Physical Therapy          SUBJECTIVE  Subjective     General  Name and  confirmed with patient on this date.  Reason for Referral: Bilat Knee Pain  Referred By: Kusum Mackey  Past Medical History Relevant to Rehab: Knee OA, Lumbar Hx, Lymphedema, Cervical Fusion  General Comment: Moderate Fall Risk  Precautions  Precautions  STEADI Fall Risk Score (The score of 4 or more indicates an increased risk of falling): 8  Precautions Comment: Moderate Fall Risk       Pain  Pain Assessment: 0-10  Pain Score: 7 (Ranges 5-9/10)  Pain Type: Chronic pain  Pain Location: Knee  Pain Orientation: Right, Left, Anterior    SUBJECTIVE:   Chief complaint:  Patient reports right more than left knee pain that has been worsening for the last 6 months since she was not able to get injections. She notes that her right knee can lock up on her. It is getting harder for her to negotiate steps up to her apartment    Pain Better: rest, medications  Pain Worse: too much activity  Prior level of function: indep living with spouse, 19-year old grandchild lives with, steps, workouts 3-5 days a week at a gym  Current limitations: unable to sustain positions for more than 30 minutes  Home setup: 1st floor apartment with 8 steps in and laundry on 2nd floor of  complex  Work: N/A  Patients goal: improve motion in knees to be able to do steps    OBJECTIVE:    Lower Extremity Strength: (5/5 unless noted)  MMT RIGHT LEFT   Hip Flexion 3- 3+   Hip Abduction 4- 4-   Hip Adduction 4 4   Knee Extension 3- 4-   Knee Flexion 4- 4   Ankle DF 3+ 4-   Ankle PF 3- 3-   Ankle EV 3- 3-     Lower Extremity ROM: WNL unless documented below:  AROM in Degrees  RIGHT LEFT   Knee Extension (EOB) -5 0   Knee Flexion (EOB) 75 100     PROM in Degrees  RIGHT LEFT   Knee Extension +4 +10   Knee Flexion 90 100     Joint mobility: patella motion WFL, but right is painful  Gait mechanics: cane, WBOS, favors right LE with reduced stance phase  Palpation: painful over lymphedema in lower legs, painful to palpation peripatellar regions (right more than left)    Functional Outcome:   Other Measures  Lower Extremity Funtional Score (LEFS): 26    TREATMENT:  Review of current HEP and gym routine. 10'    ASSESSEMENT  Pt is a 63 y.o. referred to physical therapy for a dx of right and left knee pain by uKsum Mackey A* . Pt presents with right more than left knee pain, reduced knee motion, reduced lower extremities strength, . This pt would benefit from a therapy program to restore prior level of function, reduce pain, increase AROM, increase strength, and improve gait and balance. Patient is appropriate for aquatic therapy due to severe pain, limited tolerance to standing and walking for land based therapy, and multiple other complicating ortho problems.     The physical therapy prognosis is good for the patient to achieve their goals.   The pt tolerated therapy treatment today well with no adverse effects.  Barriers to therapy include:  multiple medical and orthopedic diagnoses/problems    PLAN  The pt will be seen 2 days a week for 8 weeks.      The pt has been educated about the risks and benefits of physical therapy and gives consent for treatment.     The patient will benefit from physical  therapy treatment to include: Treatment/Interventions: Aquatic therapy, Education/ Instruction, Gait training, Therapeutic activities, Therapeutic exercises, Ultrasound, Neuromuscular re-education, Manual therapy, Hot pack, Cryotherapy    Goals:  Active       PT Problem       PT Goal 1       Start:  06/05/24    Expected End:  07/03/24       Initiate home exercise program with 50% teach back capability by the patient           PT Goal 2       Start:  06/05/24    Expected End:  07/03/24       ROM of knees improve to 0-110 to ease capability to perform stairs           PT Goal 3       Start:  06/05/24    Expected End:  07/31/24       ROM of knees improve to 0-120 to ease capability to perform stairs, transfers, and ADLs           PT Goal 4       Start:  06/05/24    Expected End:  07/31/24       Improve MMT of LE deficits to 4/5 or better to improve joint stability with transfers, standing, and walking activities           Patient Stated Goal 1       Start:  06/05/24    Expected End:  07/31/24       Able to bend the knee to be able to go up stairs         PT Goal 5       Start:  06/05/24    Expected End:  07/31/24       Functional Outcome LEFS score improves to >/= 40/80

## 2024-06-20 ENCOUNTER — TREATMENT (OUTPATIENT)
Dept: PHYSICAL THERAPY | Facility: HOSPITAL | Age: 64
End: 2024-06-20
Payer: COMMERCIAL

## 2024-06-20 DIAGNOSIS — G89.29 CHRONIC PAIN OF RIGHT KNEE: Primary | Chronic | ICD-10-CM

## 2024-06-20 DIAGNOSIS — M25.562 CHRONIC PAIN OF LEFT KNEE: ICD-10-CM

## 2024-06-20 DIAGNOSIS — M25.561 RIGHT KNEE PAIN: Chronic | ICD-10-CM

## 2024-06-20 DIAGNOSIS — M25.561 CHRONIC PAIN OF RIGHT KNEE: Primary | Chronic | ICD-10-CM

## 2024-06-20 DIAGNOSIS — G89.29 CHRONIC PAIN OF LEFT KNEE: ICD-10-CM

## 2024-06-20 DIAGNOSIS — M25.562 LEFT KNEE PAIN: Chronic | ICD-10-CM

## 2024-06-20 PROCEDURE — 97113 AQUATIC THERAPY/EXERCISES: CPT | Mod: GP,CQ

## 2024-06-20 ASSESSMENT — PAIN DESCRIPTION - DESCRIPTORS: DESCRIPTORS: ACHING

## 2024-06-20 ASSESSMENT — PAIN SCALES - GENERAL: PAINLEVEL_OUTOF10: 7

## 2024-06-20 ASSESSMENT — PAIN - FUNCTIONAL ASSESSMENT: PAIN_FUNCTIONAL_ASSESSMENT: 0-10

## 2024-06-20 NOTE — PROGRESS NOTES
"Physical Therapy    Physical Therapy Treatment    Patient Name: Vivian Tavarez  MRN: [id number]  Today's Date: 6/20/2024  Time Calculation  Start Time: 1345  Stop Time: 1430  Time Calculation (min): 45 min  VISIT 2 8  PT Therapeutic Procedures Time Entry  Aquatic Therapy Time Entry: 45    Assessment:  PT Assessment  Assessment Comment: VC for exs and technique, pt reports some \"popping and snapping\" with biking no change in pain with treatment    Plan:  OP PT Plan  PT Plan:  (progress aquatics as able for decreased [place] pain)    Current Problem  1. Chronic pain of right knee        2. Right knee pain  Follow Up In Physical Therapy      3. Left knee pain  Follow Up In Physical Therapy      4. Chronic pain of left knee            Subjective  pt reports working out on own 3-5x a week may have to have further spine surgery at some point R.L Knee pain 7/10       Precautions  Precautions  Precautions Comment: Moderate Fall Risk     Pain  Pain Assessment: 0-10  0-10 (Numeric) Pain Score: 7  Pain Type: Chronic pain  Pain Location: Knee  Pain Orientation: Right, Left  Pain Radiating Towards: R>L Knee  Pain Descriptors: Aching  Pain Frequency: Constant/continuous    Objective initiated aquatics        Treatments:     Aquatic Exercise  Aquatic Exercise Performed: Yes  Aquatic Exercise Activity 1: Amb Forw/Retro/Lat x 2 ea  Aquatic Exercise Activity 2: Squats x 15  Aquatic Exercise Activity 3: TR/HR x 15 EA  Aquatic Exercise Activity 4: SLR F/E/L x 15 ea  Aquatic Exercise Activity 5: marching 2  Aquatic Exercise Activity 6: gasroc stetch and HS 3 x15\" ea  Aquatic Exercise Activity 7: deep water biking and distraction 5' EA      Goals:  Active       PT Problem       PT Goal 1       Start:  06/05/24    Expected End:  07/03/24       Initiate home exercise program with 50% teach back capability by the patient           PT Goal 2       Start:  06/05/24    Expected End:  07/03/24       ROM of knees improve to 0-110 to ease " capability to perform stairs           PT Goal 3       Start:  06/05/24    Expected End:  07/31/24       ROM of knees improve to 0-120 to ease capability to perform stairs, transfers, and ADLs           PT Goal 4       Start:  06/05/24    Expected End:  07/31/24       Improve MMT of LE deficits to 4/5 or better to improve joint stability with transfers, standing, and walking activities           Patient Stated Goal 1       Start:  06/05/24    Expected End:  07/31/24       Able to bend the knee to be able to go up stairs         PT Goal 5       Start:  06/05/24    Expected End:  07/31/24       Functional Outcome LEFS score improves to >/= 40/80

## 2024-06-24 ENCOUNTER — APPOINTMENT (OUTPATIENT)
Dept: ORTHOPEDIC SURGERY | Facility: CLINIC | Age: 64
End: 2024-06-24
Payer: COMMERCIAL

## 2024-06-24 ENCOUNTER — HOSPITAL ENCOUNTER (OUTPATIENT)
Dept: RADIOLOGY | Facility: CLINIC | Age: 64
Discharge: HOME | End: 2024-06-24
Payer: COMMERCIAL

## 2024-06-24 VITALS — HEIGHT: 66 IN | WEIGHT: 288 LBS | BODY MASS INDEX: 46.28 KG/M2

## 2024-06-24 DIAGNOSIS — M54.12 CERVICAL RADICULOPATHY: ICD-10-CM

## 2024-06-24 PROCEDURE — 72050 X-RAY EXAM NECK SPINE 4/5VWS: CPT

## 2024-06-24 PROCEDURE — 99204 OFFICE O/P NEW MOD 45 MIN: CPT | Performed by: PHYSICIAN ASSISTANT

## 2024-06-24 PROCEDURE — 1036F TOBACCO NON-USER: CPT | Performed by: PHYSICIAN ASSISTANT

## 2024-06-24 PROCEDURE — 72050 X-RAY EXAM NECK SPINE 4/5VWS: CPT | Performed by: RADIOLOGY

## 2024-06-24 ASSESSMENT — PAIN - FUNCTIONAL ASSESSMENT: PAIN_FUNCTIONAL_ASSESSMENT: 0-10

## 2024-06-24 NOTE — PROGRESS NOTES
Vivian is a 63-year-old female reporting clinic today for evaluation of her left arm radiculopathy.    She has a history of a previous C4-6 ACDF completed in 2005 in [facility].    Current symptoms started approximately 4 months ago, she denies injury or trauma.  Her symptoms started with a stabbing pain in her left elbow, this slowly progressed to pain and numbness radiating from her neck down into her ring and pinky finger.  Her left arm feels weak.  She has noticed a change in her balance, she has been ambulating with a cane due to feeling unsteady.  No recent change in her fine motor skills or handwriting.  She is not dropping things more frequently.  No recent falls.  She has full control of her bowel and bladder.    She takes Percocet, gabapentin, and Flexeril.  She is an established patient with pain management.    Family, social, and medical histories are obtained and reviewed.  Type II diabetic, last hemoglobin A1c was 6.5, this was in 2022.    ROS: All other systems have been reviewed and are negative except as previously noted in history of present illness.    Physical Exam:  Const: Well-appearing, well-nourished overweight female in no distress.  Eyes: Normal appearing sclera and conjunctiva, no jaundice, pupils normal in appearance.  Neck: No masses or lymphadenopathy appreciated.  Resp: breathing comfortably, normal respiratory rate rate.  CV: No upper or lower extremity edema.  Musculoskeletal: Normal gait.  Unsteady tandem gait.  Cervical ROM is supple.  Strength exam of the upper extremities reveals 5/5 strength in all major muscle groups.  No intrinsic wasting.  Negative Spurling sign.    Neuro: Sensation is intact and equal bilaterally. Deep tendon reflexes are normal and symmetric.  No clonus, negative Blackwood sign, negative Lhermitte's sign  Skin: Intact without any lesions, normal turgor.  Psych: Alert and oriented x3, normal mood and affect.    I personally reviewed x-rays of the cervical  spine completed today.  There is no evidence of acute fracture or hardware failure.  Stable alignment of previous C4-6 ACDF.  There are advanced degenerative changes below the previous fusion at C6-7 and C7-T1 with disc space narrowing.    The plan moving forward is to start conservative treatment for her 4 months of left arm radiculopathy.  A referral for physical therapy was provided today.  She can continue on her current medication regimen.  If she does not have improvement after 6 weeks of physical therapy she should follow-up with me at which time consider an MRI of the cervical spine.    **This note was dictated using speech recognition software and was not corrected for spelling or grammatical errors**

## 2024-06-25 ENCOUNTER — TREATMENT (OUTPATIENT)
Dept: PHYSICAL THERAPY | Facility: HOSPITAL | Age: 64
End: 2024-06-25
Payer: COMMERCIAL

## 2024-06-25 DIAGNOSIS — M25.562 CHRONIC PAIN OF LEFT KNEE: ICD-10-CM

## 2024-06-25 DIAGNOSIS — M25.561 RIGHT KNEE PAIN: Chronic | ICD-10-CM

## 2024-06-25 DIAGNOSIS — G89.29 CHRONIC PAIN OF RIGHT KNEE: Primary | ICD-10-CM

## 2024-06-25 DIAGNOSIS — M25.561 CHRONIC PAIN OF RIGHT KNEE: Primary | ICD-10-CM

## 2024-06-25 DIAGNOSIS — M25.562 LEFT KNEE PAIN: Chronic | ICD-10-CM

## 2024-06-25 DIAGNOSIS — G89.29 CHRONIC PAIN OF LEFT KNEE: ICD-10-CM

## 2024-06-25 PROCEDURE — 97113 AQUATIC THERAPY/EXERCISES: CPT | Mod: GP,CQ

## 2024-06-25 ASSESSMENT — PAIN - FUNCTIONAL ASSESSMENT: PAIN_FUNCTIONAL_ASSESSMENT: 0-10

## 2024-06-25 ASSESSMENT — PAIN DESCRIPTION - DESCRIPTORS: DESCRIPTORS: ACHING

## 2024-06-25 ASSESSMENT — PAIN SCALES - GENERAL: PAINLEVEL_OUTOF10: 7

## 2024-06-25 NOTE — PROGRESS NOTES
"Physical Therapy    Physical Therapy Treatment    Patient Name: Vivian Tavarez  MRN: [id number]  Today's Date: 6/25/2024  Time Calculation  Start Time: 1345  Stop Time: 1430  Time Calculation (min): 45 min  VISIT 3  PT Therapeutic Procedures Time Entry  Aquatic Therapy Time Entry: 45    Assessment:  PT Assessment  Assessment Comment: pt fatigued after  increase pace, pain no change with exs and amb, VC for posture with increased pace    Plan:  OP PT Plan  PT Plan:  (progress as able for increased LE strength)    Current Problem  1. Chronic pain of right knee        2. Right knee pain  Follow Up In Physical Therapy      3. Left knee pain  Follow Up In Physical Therapy      4. Chronic pain of left knee            Subjective  pt reports was wiped out after 1st visit no increase in pain       Precautions  Precautions  Precautions Comment: Moderate Fall Risk     Pain  Pain Assessment: 0-10  0-10 (Numeric) Pain Score: 7  Pain Type: Chronic pain  Pain Location: Knee  Pain Orientation: Right, Left  Pain Radiating Towards: R > L Knee  Pain Descriptors: Aching    Objective increased pace        Treatments:     Aquatic Exercise  Aquatic Exercise Performed: Yes  Aquatic Exercise Activity 1: Amb Forw/Retro/Lat x 2 ea  Aquatic Exercise Activity 2: Squats x 15  Aquatic Exercise Activity 3: TR/HR x 15 EA  Aquatic Exercise Activity 4: SLR F/E/L x 15 ea  Aquatic Exercise Activity 5: marching 2  Aquatic Exercise Activity 6: gasroc stetch and HS 3 x15\" ea  Aquatic Exercise Activity 7: deep water biking and distraction 5' EA      Goals:  Active       PT Problem       PT Goal 1       Start:  06/05/24    Expected End:  07/03/24       Initiate home exercise program with 50% teach back capability by the patient           PT Goal 2       Start:  06/05/24    Expected End:  07/03/24       ROM of knees improve to 0-110 to ease capability to perform stairs           PT Goal 3       Start:  06/05/24    Expected End:  07/31/24       ROM of knees " improve to 0-120 to ease capability to perform stairs, transfers, and ADLs           PT Goal 4       Start:  06/05/24    Expected End:  07/31/24       Improve MMT of LE deficits to 4/5 or better to improve joint stability with transfers, standing, and walking activities           Patient Stated Goal 1       Start:  06/05/24    Expected End:  07/31/24       Able to bend the knee to be able to go up stairs         PT Goal 5       Start:  06/05/24    Expected End:  07/31/24       Functional Outcome LEFS score improves to >/= 40/80

## 2024-06-27 ENCOUNTER — TREATMENT (OUTPATIENT)
Dept: PHYSICAL THERAPY | Facility: HOSPITAL | Age: 64
End: 2024-06-27
Payer: COMMERCIAL

## 2024-06-27 DIAGNOSIS — M25.561 RIGHT KNEE PAIN: Chronic | ICD-10-CM

## 2024-06-27 DIAGNOSIS — M25.562 CHRONIC PAIN OF LEFT KNEE: ICD-10-CM

## 2024-06-27 DIAGNOSIS — G89.29 CHRONIC PAIN OF RIGHT KNEE: Primary | ICD-10-CM

## 2024-06-27 DIAGNOSIS — M25.561 CHRONIC PAIN OF RIGHT KNEE: Primary | ICD-10-CM

## 2024-06-27 DIAGNOSIS — M25.562 LEFT KNEE PAIN: Chronic | ICD-10-CM

## 2024-06-27 DIAGNOSIS — G89.29 CHRONIC PAIN OF LEFT KNEE: ICD-10-CM

## 2024-06-27 PROCEDURE — 97113 AQUATIC THERAPY/EXERCISES: CPT | Mod: GP,CQ

## 2024-06-27 ASSESSMENT — PAIN DESCRIPTION - DESCRIPTORS: DESCRIPTORS: SORE

## 2024-06-27 ASSESSMENT — PAIN - FUNCTIONAL ASSESSMENT: PAIN_FUNCTIONAL_ASSESSMENT: 0-10

## 2024-06-27 NOTE — PROGRESS NOTES
"Physical Therapy    Physical Therapy Treatment    Patient Name: Vivian Tavarez  MRN: [id number]  Today's Date: 6/27/2024  Time Calculation  Start Time: 1340  Stop Time: 1425  Time Calculation (min): 45 min  VISIT 47  PT Therapeutic Procedures Time Entry  Aquatic Therapy Time Entry: 45    Assessment:  PT Assessment  Assessment Comment: pt reports walking felt pretty good but marching irritated [place], after deep water exs pain 6/10    Plan:  OP PT Plan  PT Plan:  (cont to progress for increased LE strength)    Current Problem  1. Chronic pain of right knee        2. Right knee pain  Follow Up In Physical Therapy      3. Left knee pain  Follow Up In Physical Therapy      4. Chronic pain of left knee            Subjective  pt reports not working out as much on own now that she's doing therapy       Precautions  Precautions  Precautions Comment: Moderate Fall Risk     Pain  Pain Assessment: 0-10  Pain Type: Chronic pain  Pain Location: Knee  Pain Orientation: Right, Left  Pain Radiating Towards: R > L: Knee  Pain Descriptors: Sore  Pain Frequency: Constant/continuous    Objective increased amb and increased deep water kicks and reps        Treatments:     Aquatic Exercise  Aquatic Exercise Performed: Yes  Aquatic Exercise Activity 1: Amb Forw/Retro/Lat x 3 ea  Aquatic Exercise Activity 2: Squats x 20  Aquatic Exercise Activity 3: TR/HR x20 EA  Aquatic Exercise Activity 4: SLR F/E/L x 20 ea  Aquatic Exercise Activity 5: marching 2  Aquatic Exercise Activity 6: gasroc stetch and HS 3 x15\" ea  Aquatic Exercise Activity 7: deep water biking and distraction 5' EA  Aquatic Exercise Activity 8: deep water kicks2 x 20 EA JJ, X-country ski  Aquatic Exercise Activity 9: step ups 5' section 15 EA      Goals:  Active       PT Problem       PT Goal 1       Start:  06/05/24    Expected End:  07/03/24       Initiate home exercise program with 50% teach back capability by the patient           PT Goal 2       Start:  06/05/24    " Expected End:  07/03/24       ROM of knees improve to 0-110 to ease capability to perform stairs           PT Goal 3       Start:  06/05/24    Expected End:  07/31/24       ROM of knees improve to 0-120 to ease capability to perform stairs, transfers, and ADLs           PT Goal 4       Start:  06/05/24    Expected End:  07/31/24       Improve MMT of LE deficits to 4/5 or better to improve joint stability with transfers, standing, and walking activities           Patient Stated Goal 1       Start:  06/05/24    Expected End:  07/31/24       Able to bend the knee to be able to go up stairs         PT Goal 5       Start:  06/05/24    Expected End:  07/31/24       Functional Outcome LEFS score improves to >/= 40/80

## 2024-07-01 ENCOUNTER — TREATMENT (OUTPATIENT)
Dept: PHYSICAL THERAPY | Facility: HOSPITAL | Age: 64
End: 2024-07-01
Payer: COMMERCIAL

## 2024-07-01 DIAGNOSIS — G89.29 CHRONIC PAIN OF LEFT KNEE: ICD-10-CM

## 2024-07-01 DIAGNOSIS — M25.561 RIGHT KNEE PAIN: Chronic | ICD-10-CM

## 2024-07-01 DIAGNOSIS — M25.561 CHRONIC PAIN OF RIGHT KNEE: Primary | ICD-10-CM

## 2024-07-01 DIAGNOSIS — G89.29 CHRONIC PAIN OF RIGHT KNEE: Primary | ICD-10-CM

## 2024-07-01 DIAGNOSIS — M25.562 LEFT KNEE PAIN: Chronic | ICD-10-CM

## 2024-07-01 DIAGNOSIS — M25.562 CHRONIC PAIN OF LEFT KNEE: ICD-10-CM

## 2024-07-01 PROCEDURE — 97113 AQUATIC THERAPY/EXERCISES: CPT | Mod: GP,CQ

## 2024-07-01 ASSESSMENT — PAIN SCALES - GENERAL: PAINLEVEL_OUTOF10: 7

## 2024-07-01 ASSESSMENT — PAIN DESCRIPTION - DESCRIPTORS: DESCRIPTORS: ACHING

## 2024-07-01 ASSESSMENT — PAIN - FUNCTIONAL ASSESSMENT: PAIN_FUNCTIONAL_ASSESSMENT: 0-10

## 2024-07-01 NOTE — PROGRESS NOTES
"Physical Therapy    Physical Therapy Treatment    Patient Name: Vivian Tavarez  MRN: [id number]  Today's Date: 7/1/2024  Time Calculation  Start Time: 1340  Stop Time: 1425  Time Calculation (min): 45 min  VISIT 5  PT Therapeutic Procedures Time Entry  Aquatic Therapy Time Entry: 45    Assessment:  PT Assessment  Assessment Comment: pt reports some increased snapping in R Knee with deep water biking today, no change in pain    Plan:  OP PT Plan  PT Plan:  (add lunges in 2 visits)    Current Problem  1. Chronic pain of right knee        2. Right knee pain  Follow Up In Physical Therapy      3. Left knee pain  Follow Up In Physical Therapy      4. Chronic pain of left knee            Subjective  pt reports no new C/O       Precautions  Precautions  Precautions Comment: Moderate Fall Risk     Pain  Pain Assessment: 0-10  0-10 (Numeric) Pain Score: 7  Pain Type: Chronic pain  Pain Location: Knee  Pain Orientation: Right, Left  Pain Radiating Towards: R > L Knee  Pain Descriptors: Aching  Pain Frequency: Constant/continuous    Objective         Treatments:     Aquatic Exercise  Aquatic Exercise Performed: Yes  Aquatic Exercise Activity 1: Amb Forw/Retro/Lat x 3 ea  Aquatic Exercise Activity 2: Squats x 20  Aquatic Exercise Activity 3: TR/HR x20 EA  Aquatic Exercise Activity 4: SLR F/E/L x 20 ea  Aquatic Exercise Activity 5: marching 2  Aquatic Exercise Activity 6: gasroc stetch and HS 3 x15\" ea  Aquatic Exercise Activity 7: deep water biking and distraction 5' EA  Aquatic Exercise Activity 8: deep water kicks2 x 20 EA JJ, X-country ski  Aquatic Exercise Activity 9: step ups 5' section 15 EA      Goals:  Active       PT Problem       PT Goal 1       Start:  06/05/24    Expected End:  07/03/24       Initiate home exercise program with 50% teach back capability by the patient           PT Goal 2       Start:  06/05/24    Expected End:  07/03/24       ROM of knees improve to 0-110 to ease capability to perform stairs      "      PT Goal 3       Start:  06/05/24    Expected End:  07/31/24       ROM of knees improve to 0-120 to ease capability to perform stairs, transfers, and ADLs           PT Goal 4       Start:  06/05/24    Expected End:  07/31/24       Improve MMT of LE deficits to 4/5 or better to improve joint stability with transfers, standing, and walking activities           Patient Stated Goal 1       Start:  06/05/24    Expected End:  07/31/24       Able to bend the knee to be able to go up stairs         PT Goal 5       Start:  06/05/24    Expected End:  07/31/24       Functional Outcome LEFS score improves to >/= 40/80

## 2024-07-03 ENCOUNTER — TREATMENT (OUTPATIENT)
Dept: PHYSICAL THERAPY | Facility: HOSPITAL | Age: 64
End: 2024-07-03
Payer: COMMERCIAL

## 2024-07-03 DIAGNOSIS — M25.562 LEFT KNEE PAIN: Chronic | ICD-10-CM

## 2024-07-03 DIAGNOSIS — M25.561 RIGHT KNEE PAIN: Primary | Chronic | ICD-10-CM

## 2024-07-03 DIAGNOSIS — M25.561 RIGHT KNEE PAIN: Chronic | ICD-10-CM

## 2024-07-03 PROCEDURE — 97530 THERAPEUTIC ACTIVITIES: CPT | Mod: GP | Performed by: PHYSICAL THERAPIST

## 2024-07-03 PROCEDURE — 97113 AQUATIC THERAPY/EXERCISES: CPT | Mod: GP,CQ | Performed by: PHYSICAL THERAPY ASSISTANT

## 2024-07-03 ASSESSMENT — PAIN DESCRIPTION - DESCRIPTORS
DESCRIPTORS: ACHING
DESCRIPTORS: ACHING

## 2024-07-03 ASSESSMENT — PAIN - FUNCTIONAL ASSESSMENT
PAIN_FUNCTIONAL_ASSESSMENT: 0-10
PAIN_FUNCTIONAL_ASSESSMENT: 0-10

## 2024-07-03 ASSESSMENT — PAIN SCALES - GENERAL
PAINLEVEL_OUTOF10: 7
PAINLEVEL_OUTOF10: 7

## 2024-07-03 NOTE — PROGRESS NOTES
Physical Therapy    Physical Therapy Treatment    Patient Name: Vivian Tavarez  MRN: [id number]  : 1960   Today's Date: 7/3/2024  Time Calculation  Start Time: 1430  Stop Time: 1445  Time Calculation (min): 15 min  PT Therapeutic Procedures Time Entry  Therapeutic Activity Time Entry: 15           Visit Number: 7  Auth: [id number], M25.562  30v 6/3/24 to 24  [id number]  [id number]    Current Problem  Problem List Items Addressed This Visit             ICD-10-CM    Right knee pain - Primary M25.561    Left knee pain M25.562        Subjective   Patient reports her left knee is not currently painful, but the right knee is popping more and is painful with movement, including exercises even in the pool.  She notes her most painful area is her neck, but she is having to wait to finish therapy for her knees before addressing the neck.    Precautions  Precautions  STEADI Fall Risk Score (The score of 4 or more indicates an increased risk of falling): 8  Precautions Comment: Moderate Fall Risk    Pain  Pain Assessment: 0-10  0-10 (Numeric) Pain Score: 7  Pain Type: Chronic pain  Pain Location: Knee  Pain Orientation: Right  Pain Descriptors: Aching  Pain Frequency: Constant/continuous    Objective   MMT RIGHT LEFT   Hip Flexion 3+ 4-   Hip Abduction 5 5   Hip Adduction 5 5   Knee Extension 3+ p! 4+   Knee Flexion 4+ 5   Ankle DF 3+ 4-   Ankle PF 3- 3-   Ankle EV 3- 3-     Lower Extremity ROM: WNL unless documented below:  AROM in Degrees  RIGHT LEFT   Knee Extension (EOB) -2 0   Knee Flexion (EOB) 88 100     PROM in Degrees  RIGHT LEFT   Knee Extension +4 +10   Knee Flexion 90 100        Treatments:  Reassessment - measurements and POC discussion - 15'    Assessment:   Patient is demonstrating improved motion and strength.     Plan:  Patient has 3 more aquatic sessions. Reassessment end of next week.     OP PT Plan  Treatment/Interventions: Aquatic therapy, Education/ Instruction, Gait training, Therapeutic activities,  Therapeutic exercises, Ultrasound, Neuromuscular re-education, Manual therapy, Hot pack, Cryotherapy  PT Plan: Skilled PT    Goals:  Active       PT Problem       PT Goal 1 (Met)       Start:  06/05/24    Expected End:  07/03/24    Resolved:  07/03/24    Initiate home exercise program with 50% teach back capability by the patient           PT Goal 2 (Progressing)       Start:  06/05/24    Expected End:  07/03/24       ROM of knees improve to 0-110 to ease capability to perform stairs           PT Goal 3       Start:  06/05/24    Expected End:  07/31/24       ROM of knees improve to 0-120 to ease capability to perform stairs, transfers, and ADLs           PT Goal 4 (Progressing)       Start:  06/05/24    Expected End:  07/31/24       Improve MMT of LE deficits to 4/5 or better to improve joint stability with transfers, standing, and walking activities           Patient Stated Goal 1 (Not Progressing)       Start:  06/05/24    Expected End:  07/31/24       Able to bend the knee to be able to go up stairs      Goal Note       Right knee popping with this activity is limiting progress              PT Goal 5       Start:  06/05/24    Expected End:  07/31/24       Functional Outcome LEFS score improves to >/= 40/80

## 2024-07-03 NOTE — PROGRESS NOTES
"Physical Therapy    Physical Therapy Treatment    Patient Name: Vivian Tavarez  MRN: [id number]  Today's Date: 7/3/2024  Time Calculation  Start Time: 1330  Stop Time: 1415  Time Calculation (min): 45 min  VISIT 6  PT Therapeutic Procedures Time Entry  Aquatic Therapy Time Entry: 45    Assessment:  PT Assessment  Assessment Comment: pt to have recheck after aquatic session this date. She tolerated treatment well and had no c/o increased pain. pt with good understanding of aquatic program which includes technique.    Plan:  OP PT Plan  PT Plan: Skilled PT (progress program as tolerated to assist with achieving all goals.)    Current Problem  1. Right knee pain  Follow Up In Physical Therapy      2. Left knee pain  Follow Up In Physical Therapy          Subjective  pt reported she feels like she is getting stronger with aquatic PT.        Precautions  Precautions  Precautions Comment: Moderate Fall Risk     Pain  Pain Assessment: 0-10  0-10 (Numeric) Pain Score: 7  Pain Type: Chronic pain  Pain Location: Knee  Pain Orientation: Right, Left  Pain Radiating Towards: R>L  Pain Descriptors: Aching  Pain Frequency: Constant/continuous            Treatments:     Aquatic Exercise  Aquatic Exercise Performed: Yes  Aquatic Exercise Activity 1: Amb Forw/Retro/Lat x 3 ea  Aquatic Exercise Activity 2: Squats x 20  Aquatic Exercise Activity 3: TR/HR x20 EA  Aquatic Exercise Activity 4: SLR F/E/L x 20 ea  Aquatic Exercise Activity 5: marching 2  Aquatic Exercise Activity 6: gastroc stretch and HS 3 x 15\" ea  Aquatic Exercise Activity 7: deep water biking and distraction 5' EA  Aquatic Exercise Activity 8: deep water kicks 2 x 20 EA JJ, X-country ski  Aquatic Exercise Activity 9: step ups 5' section 15 EA      Goals:  Active       PT Problem       PT Goal 1       Start:  06/05/24    Expected End:  07/03/24       Initiate home exercise program with 50% teach back capability by the patient           PT Goal 2       Start:  06/05/24    " Expected End:  07/03/24       ROM of knees improve to 0-110 to ease capability to perform stairs           PT Goal 3       Start:  06/05/24    Expected End:  07/31/24       ROM of knees improve to 0-120 to ease capability to perform stairs, transfers, and ADLs           PT Goal 4       Start:  06/05/24    Expected End:  07/31/24       Improve MMT of LE deficits to 4/5 or better to improve joint stability with transfers, standing, and walking activities           Patient Stated Goal 1       Start:  06/05/24    Expected End:  07/31/24       Able to bend the knee to be able to go up stairs         PT Goal 5       Start:  06/05/24    Expected End:  07/31/24       Functional Outcome LEFS score improves to >/= 40/80

## 2024-07-08 ENCOUNTER — TREATMENT (OUTPATIENT)
Dept: PHYSICAL THERAPY | Facility: HOSPITAL | Age: 64
End: 2024-07-08
Payer: COMMERCIAL

## 2024-07-08 DIAGNOSIS — M25.562 LEFT KNEE PAIN: Chronic | ICD-10-CM

## 2024-07-08 DIAGNOSIS — M25.561 RIGHT KNEE PAIN: Chronic | ICD-10-CM

## 2024-07-08 DIAGNOSIS — G89.29 CHRONIC PAIN OF RIGHT KNEE: Primary | ICD-10-CM

## 2024-07-08 DIAGNOSIS — M25.561 CHRONIC PAIN OF RIGHT KNEE: Primary | ICD-10-CM

## 2024-07-08 DIAGNOSIS — G89.29 CHRONIC PAIN OF LEFT KNEE: ICD-10-CM

## 2024-07-08 DIAGNOSIS — M25.562 CHRONIC PAIN OF LEFT KNEE: ICD-10-CM

## 2024-07-08 PROCEDURE — 97113 AQUATIC THERAPY/EXERCISES: CPT | Mod: GP,CQ

## 2024-07-08 ASSESSMENT — PAIN DESCRIPTION - DESCRIPTORS: DESCRIPTORS: ACHING

## 2024-07-08 ASSESSMENT — PAIN - FUNCTIONAL ASSESSMENT: PAIN_FUNCTIONAL_ASSESSMENT: 0-10

## 2024-07-08 NOTE — PROGRESS NOTES
"Physical Therapy    Physical Therapy Treatment    Patient Name: Vivian Tavarez  MRN: [id number]  Today's Date: 7/8/2024  Time Calculation  Start Time: 1345  Stop Time: 1430  Time Calculation (min): 45 min  VISIT 7  PT Therapeutic Procedures Time Entry  Aquatic Therapy Time Entry: 45    Assessment:  PT Assessment  Assessment Comment: decreased tightness in El LEs and Hips no change in pain in R Knee, improved gait and pace in water    Plan:  OP PT Plan  PT Plan:  (progress as able for increased LE strength)    Current Problem  1. Chronic pain of right knee        2. Right knee pain  Follow Up In Physical Therapy      3. Left knee pain  Follow Up In Physical Therapy      4. Chronic pain of left knee            Subjective  pt reports had a cookout for the holiday       Precautions  Precautions  Precautions Comment: Moderate Fall Risk     Pain  Pain Assessment: 0-10  Pain Type: Chronic pain  Pain Location: Knee  Pain Orientation: Right  Pain Descriptors: Aching  Pain Frequency: Constant/continuous    Objective         Treatments:     Aquatic Exercise  Aquatic Exercise Performed: Yes  Aquatic Exercise Activity 1: Amb Forw/Retro/Lat x 3 ea  Aquatic Exercise Activity 2: Squats x 20  Aquatic Exercise Activity 3: TR/HR x20 EA  Aquatic Exercise Activity 4: SLR F/E/L x 20 ea  Aquatic Exercise Activity 5: marching 2  Aquatic Exercise Activity 6: gasroc stetch and HS 3 x15\" ea  Aquatic Exercise Activity 7: deep water biking and distraction 5' EA  Aquatic Exercise Activity 8: deep water kicks2 x 20 EA JJ, X-country ski  Aquatic Exercise Activity 9: step ups 5' section 15 EA      Goals:  Active       PT Problem       PT Goal 1 (Met)       Start:  06/05/24    Expected End:  07/03/24    Resolved:  07/03/24    Initiate home exercise program with 50% teach back capability by the patient           PT Goal 2 (Progressing)       Start:  06/05/24    Expected End:  07/03/24       ROM of knees improve to 0-110 to ease capability to perform " stairs           PT Goal 3       Start:  06/05/24    Expected End:  07/31/24       ROM of knees improve to 0-120 to ease capability to perform stairs, transfers, and ADLs           PT Goal 4 (Progressing)       Start:  06/05/24    Expected End:  07/31/24       Improve MMT of LE deficits to 4/5 or better to improve joint stability with transfers, standing, and walking activities           Patient Stated Goal 1 (Not Progressing)       Start:  06/05/24    Expected End:  07/31/24       Able to bend the knee to be able to go up stairs      Goal Note       Right knee popping with this activity is limiting progress              PT Goal 5       Start:  06/05/24    Expected End:  07/31/24       Functional Outcome LEFS score improves to >/= 40/80

## 2024-07-11 ENCOUNTER — TREATMENT (OUTPATIENT)
Dept: PHYSICAL THERAPY | Facility: HOSPITAL | Age: 64
End: 2024-07-11
Payer: COMMERCIAL

## 2024-07-11 DIAGNOSIS — M25.562 LEFT KNEE PAIN: Chronic | ICD-10-CM

## 2024-07-11 DIAGNOSIS — G89.29 CHRONIC PAIN OF RIGHT KNEE: Primary | ICD-10-CM

## 2024-07-11 DIAGNOSIS — M25.562 CHRONIC PAIN OF LEFT KNEE: ICD-10-CM

## 2024-07-11 DIAGNOSIS — M25.561 CHRONIC PAIN OF RIGHT KNEE: Primary | ICD-10-CM

## 2024-07-11 DIAGNOSIS — M25.561 RIGHT KNEE PAIN: Chronic | ICD-10-CM

## 2024-07-11 DIAGNOSIS — M54.12 CERVICAL RADICULOPATHY: ICD-10-CM

## 2024-07-11 DIAGNOSIS — G89.29 CHRONIC PAIN OF LEFT KNEE: ICD-10-CM

## 2024-07-11 PROCEDURE — 97113 AQUATIC THERAPY/EXERCISES: CPT | Mod: GP,CQ

## 2024-07-11 PROCEDURE — 97530 THERAPEUTIC ACTIVITIES: CPT | Mod: GP | Performed by: PHYSICAL THERAPIST

## 2024-07-11 ASSESSMENT — PAIN SCALES - GENERAL
PAINLEVEL_OUTOF10: 6
PAINLEVEL_OUTOF10: 6

## 2024-07-11 ASSESSMENT — PAIN - FUNCTIONAL ASSESSMENT
PAIN_FUNCTIONAL_ASSESSMENT: 0-10
PAIN_FUNCTIONAL_ASSESSMENT: 0-10

## 2024-07-11 ASSESSMENT — PAIN DESCRIPTION - DESCRIPTORS
DESCRIPTORS: ACHING;TIGHTNESS
DESCRIPTORS: ACHING

## 2024-07-11 NOTE — PROGRESS NOTES
"Physical Therapy    Physical Therapy Discharge    Patient Name: Vivian Tavarez  MRN: [id number]  : 1960   Today's Date: 2024  Time Calculation  Start Time: 1303  Stop Time: 1318  Time Calculation (min): 15 min  PT Therapeutic Procedures Time Entry  Therapeutic Activity Time Entry: 15           Visit Number: 9  Auth: DX: M25.561, M25.562  30v 6/3/24 to 24  [id number]  [id number]    Current Problem  Problem List Items Addressed This Visit             ICD-10-CM    Right knee pain - Primary M25.561    Left knee pain M25.562     Other Visit Diagnoses         Codes    Cervical radiculopathy     M54.12             Subjective   Patient reports the sessions in the pool are helping her. She notes that it is easier to get around in the pool without the stabbing pain in her knee and get the exercise she needs without significantly increasing her pain.     Her doctor is requesting for a cortisone injection for her right knee since she doesn't qualify for the \"gel injection\".    She would like to attend PT for her neck, so she is requesting putting therapy for the knees on hold for now.    Precautions  Precautions  STEADI Fall Risk Score (The score of 4 or more indicates an increased risk of falling): No change  Precautions Comment: Moderate Fall Risk    Pain  Pain Assessment: 0-10  0-10 (Numeric) Pain Score: 6  Pain Location: Knee  Pain Orientation: Right  Pain Descriptors: Aching    Objective   Lower Extremity Strength: (5/5 unless noted)  MMT RIGHT LEFT   Hip Flexion 3 4-   Hip Abduction 4+ 4+   Hip Adduction 4+ 4+   Knee Extension 4 4+   Knee Flexion 4 4+     Lower Extremity ROM: WNL unless documented below:  AROM in Degrees  RIGHT LEFT   Knee Extension (EOB) -2 0   Knee Flexion (EOB) 100 100        Outcome Measures:  Other Measures  Lower Extremity Funtional Score (LEFS): 21 (Lack of progress not reflective of patient's subjective)    Treatments:  Reassessment measurements and functional outcome testing. " 15'    Assessment:   Patient is demonstrating progress in knee ROM and LE strength. Patient reports improved activity with aquatic interventions. Patient would like her neck pain prioritized at this time and is requesting discharge from formal therapy for her knees to be able to address the neck.    Plan:  OP PT Plan  Treatment/Interventions: Education/ Instruction, Therapeutic exercises  PT Plan: No Additional PT interventions required at this time (for the knees)    Goals:  Resolved       PT Problem       PT Goal 1 (Met)       Start:  06/05/24    Expected End:  07/03/24    Resolved:  07/03/24    Initiate home exercise program with 50% teach back capability by the patient           PT Goal 2 (Progressing)       Start:  06/05/24    Expected End:  07/03/24    Resolved:  07/11/24    ROM of knees improve to 0-110 to ease capability to perform stairs           PT Goal 3 (Progressing)       Start:  06/05/24    Expected End:  07/31/24    Resolved:  07/11/24    ROM of knees improve to 0-120 to ease capability to perform stairs, transfers, and ADLs           PT Goal 4 (Met)       Start:  06/05/24    Expected End:  07/31/24    Resolved:  07/11/24    Improve MMT of LE deficits to 4/5 or better to improve joint stability with transfers, standing, and walking activities           Patient Stated Goal 1 (Progressing)       Start:  06/05/24    Expected End:  07/31/24    Resolved:  07/11/24    Able to bend the knee to be able to go up stairs         PT Goal 5 (Not Progressing)       Start:  06/05/24    Expected End:  07/31/24    Resolved:  07/11/24    Functional Outcome LEFS score improves to >/= 40/80

## 2024-07-11 NOTE — PROGRESS NOTES
"Physical Therapy    Physical Therapy Treatment    Patient Name: Vivian Tavarez  MRN: [id number]  Today's Date: 7/11/2024  Time Calculation  Start Time: 1340  Stop Time: 1425  Time Calculation (min): 45 min  VISIT 8  PT Therapeutic Procedures Time Entry  Aquatic Therapy Time Entry: 45    Assessment:  PT Assessment  Assessment Comment: good manuel of all exs decreased pain in R Knee and tightness, 4-5/10    Plan:  OP PT Plan  PT Plan:  (D/C pool at this time)    Current Problem  1. Chronic pain of right knee        2. Right knee pain  Follow Up In Physical Therapy      3. Left knee pain  Follow Up In Physical Therapy      4. Chronic pain of left knee            Subjective  pt reports pain about a 6/10 in R Knee after reassess       Precautions  Precautions  Precautions Comment: Moderate Fall Risk     Pain  Pain Assessment: 0-10  0-10 (Numeric) Pain Score: 6  Pain Location: Knee  Pain Orientation: Right  Pain Descriptors: Aching, Tightness  Pain Frequency: Constant/continuous    Objective         Treatments:     Aquatic Exercise  Aquatic Exercise Performed: Yes  Aquatic Exercise Activity 1: Amb Forw/Retro/Lat x 3 ea  Aquatic Exercise Activity 2: Squats x 20  Aquatic Exercise Activity 3: TR/HR x20 EA  Aquatic Exercise Activity 4: SLR F/E/L x 20 ea  Aquatic Exercise Activity 5: marching 2  Aquatic Exercise Activity 6: gasroc stetch and HS 3 x15\" ea  Aquatic Exercise Activity 7: deep water biking and distraction 5' EA  Aquatic Exercise Activity 8: deep water kicks2 x 20 EA JJ, X-country ski  Aquatic Exercise Activity 9: step ups 5' section 15 EA      Goals:  Active       PT Problem       PT Goal 1 (Met)       Start:  06/05/24    Expected End:  07/03/24    Resolved:  07/03/24    Initiate home exercise program with 50% teach back capability by the patient           PT Goal 2 (Progressing)       Start:  06/05/24    Expected End:  07/03/24       ROM of knees improve to 0-110 to ease capability to perform stairs           PT Goal " 3       Start:  06/05/24    Expected End:  07/31/24       ROM of knees improve to 0-120 to ease capability to perform stairs, transfers, and ADLs           PT Goal 4 (Progressing)       Start:  06/05/24    Expected End:  07/31/24       Improve MMT of LE deficits to 4/5 or better to improve joint stability with transfers, standing, and walking activities           Patient Stated Goal 1 (Not Progressing)       Start:  06/05/24    Expected End:  07/31/24       Able to bend the knee to be able to go up stairs      Goal Note       Right knee popping with this activity is limiting progress              PT Goal 5       Start:  06/05/24    Expected End:  07/31/24       Functional Outcome LEFS score improves to >/= 40/80

## 2024-07-16 ENCOUNTER — APPOINTMENT (OUTPATIENT)
Dept: PHYSICAL THERAPY | Facility: HOSPITAL | Age: 64
End: 2024-07-16
Payer: COMMERCIAL

## 2024-07-23 ENCOUNTER — PROCEDURE VISIT (OUTPATIENT)
Dept: NEUROLOGY | Facility: HOSPITAL | Age: 64
End: 2024-07-23
Payer: COMMERCIAL

## 2024-07-23 DIAGNOSIS — G56.22 CUBITAL TUNNEL SYNDROME ON LEFT: Primary | ICD-10-CM

## 2024-07-23 PROCEDURE — 76881 US COMPL JOINT R-T W/IMG: CPT | Performed by: PSYCHIATRY & NEUROLOGY

## 2024-07-23 NOTE — PROGRESS NOTES
INDICATION:  Clinical Information: She has had left elbow and neck pain radiating though the left arm and hand over 2-3 months. She also developed numbness in the left 4-5 digits with  weakness. She had prior history of left carpal tunnel release surgery. Current study is to evaluate for left ulnar neuropathy at the elbow.    Neuromuscular ultrasound to be performed:  (a) to evaluate the echotexture and size of the left ulnar nerve throughout its course in the limb;   (b) to assess for any identifiable mechanical source of ulnar nerve compression;   (c) to identify any dynamic source of neuropathy from nerve subluxation or dislocation;  (d) to assess adjacent structures around the left elbow for abnormalities     HEIGHT: 5 ft 6 in  WEIGHT: 286 lbs.  HANDEDNESS: Right    COMPARISON:   None.    TECHNIQUE:  The left ulnar nerve and accompanying structures were studied throughout their entire anatomic course in the limb from the wrist to the midarm, including real-time cine imaging. The study was performed using a [employer] P9 ultrasound machine with a 15-6 MHz matrix linear transducer. Cross sectional area (CSA) was measured within the epineurium, using the trace method. At locations where repeat measurements were taken, the mean value is reported below. Transverse and longitudinal images were obtained. For comparison purposes, the left median nerve was also examined at the distal wrist crease and in the mid-forearm, both in transverse and longitudinal views. For the median nerve, the patient was examined supine with the arm extended and supinated. For the ulnar nerve, the patient was placed supine with the arms externally rotated, abducted, and slightly flexed at the elbow. With the elbow extended, the transducer was placed at the level of the medial epicondyle as the patient´s elbow was passively flexed to determine if either ulnar nerve subluxed or dislocated out of the groove.    FINDINGS:  At the left wrist at the  distal wrist crease (proximal carpal tunnel), the median nerve CSA was 15.7 mm2 (NL < 10 mm2; borderline 10-13 mm2; ABN > 13 mm2).     The echogenicity of the median nerve was normal. The mobility of the median nerve with flexion and extension of the fingers was normal. Color Doppler of the median nerve showed normal median nerve vascularity. No abnormal tenosynovitis of the flexor tendons was noted.    Using a longitudinal scan of the left median nerve at the wrist, a notch sign was not seen under the flexor retinaculum.     A persistent median artery was not present. A bifid median nerve was not present. No other abnormal mass or ganglia was appreciated in the carpal tunnel. With extension of the fingers, there was no abnormal intrusion of the flexor digitorum sublimis. With flexion of the fingers, there was no abnormal intrusion of the lumbrical muscles.    In the mid-forearm, approximately 12 cm proximal to the distal wrist crease, the median nerve was seen between the flexor digitorum sublimis and flexor digitorum profundus muscles. At the mid-forearm, the median nerve CSA was 15.4 mm2. The ratio of the median CSAs between the wrist and forearm (WFR) was 1.0 (NL < 1.5; borderline: 1.5 - 2.0). The echogenicity of the median nerve in the forearm was normal.    The median nerve was then followed proximal into the forearm to the antecubital fossa. The median nerve was normal in size and echogenicity adjacent to the brachial artery.     Scanning the muscles, the echogenicity was normal of the abductor pollicis brevis, flexor pollicis brevis (superficial head), flexor digitorum sublimis, flexor digitorum profundus, flexor pollicis longus, flexor carpi radialis, and pronator teres.    Attention was then turned to the ulnar nerve. At the wrist, the ulnar CSA was 9.6 mm2 (NL < 10 mm2). The echogenicity was normal.    At the mid-forearm slightly proximal to the location where the ulnar artery  from the ulnar  nerve, the CSA was 11.2 mm2 (NL < 10 mm2). The echogenicity was normal.    At the level of cubital tunnel, the ulnar nerve with the largest CSA was located in between the two heads of the flexor carpi ulnaris. The CSA at this location was 9.4 mm2 (NL < 10 mm2; mild ABN 10-15 mm2; moderate ABN 15-20 mm2; severely ABN > 20 mm2). The echogenicity was normal.    At the level of retro condylar groove, the ulnar nerve with the largest CSA was located between the medial epicondyle and olecranon. The CSA at this location was 8.1 mm2 (NL < 10 mm2; mild ABN 10-15 mm2; moderate ABN 15-20 mm2; severely ABN > 20 mm2). The echogenicity was normal.    Color Doppler of the ulnar nerve at the elbow showed normal nerve vascularity. No abnormal mass was appreciated affecting the ulnar nerve in the elbow. An anconeus epitrochlearis muscle was not appreciated at the elbow.     At the mid-arm under the fascia of the medial triceps, the CSA was 9.0 mm2 (NL < 10 mm2). The echogenicity was normal. The ulnar nerve was the followed to the axilla and was normal.    The ratio of the largest CSAs between the elbow and mid-forearm was 0.8 (NL < 1.5).    The ratio of the largest CSAs between the elbow and mid-arm was 1.0 (NL < 1.5).    When the patient fully flexed the elbow, the ulnar nerve did not sublux or dislocate of the groove.     Attention was then turned to the left elbow joint. The radiocapitellar joint was normal. The ulnar trochlear joint was normal. No abnormal effusion was present. The hyaline cartilage was well seen. On longitudinal imaging, the fat pads at the radial fossa and coronoid fossa were normal in location without any abnormal effusion. Posteriorly, the triceps tendon inserted into olecranon and olecranon fossa was normal without effusion.      IMPRESSION:    This is a essentially normal neuromuscular ultrasound examination of the left upper extremity.     There was no ultrasound evidence of ulnar neuropathy at the elbow  or median neuropathy at the wrist. The ulnar and median nerves were followed through their anatomic course in the limb from wrist to midarm and were otherwise normal.    The mild enlargement of the left median nerve at the wrist and forearm could be due to the patient's BMI or history of prior carpal tunnel decompression with residual findings. However, there was no evidence of ongoing compression of the median nerve at the wrist.    The other visualized osseous, ligamentous and tendinous structures appeared normal.      Performed by: Mack Eckert MD  Authorized by: Mack Eckert MD

## 2024-07-25 ENCOUNTER — TELEPHONE (OUTPATIENT)
Dept: ORTHOPEDIC SURGERY | Facility: CLINIC | Age: 64
End: 2024-07-25
Payer: COMMERCIAL

## 2024-07-25 DIAGNOSIS — M54.12 CERVICAL RADICULOPATHY: Primary | ICD-10-CM

## 2024-08-07 ENCOUNTER — EVALUATION (OUTPATIENT)
Dept: PHYSICAL THERAPY | Facility: HOSPITAL | Age: 64
End: 2024-08-07
Payer: COMMERCIAL

## 2024-08-07 DIAGNOSIS — M54.12 CERVICAL RADICULOPATHY: Primary | ICD-10-CM

## 2024-08-07 PROCEDURE — 97161 PT EVAL LOW COMPLEX 20 MIN: CPT | Mod: GP | Performed by: PHYSICAL THERAPIST

## 2024-08-07 PROCEDURE — 97110 THERAPEUTIC EXERCISES: CPT | Mod: GP | Performed by: PHYSICAL THERAPIST

## 2024-08-07 ASSESSMENT — PAIN SCALES - GENERAL: PAINLEVEL_OUTOF10: 2

## 2024-08-07 ASSESSMENT — PAIN - FUNCTIONAL ASSESSMENT: PAIN_FUNCTIONAL_ASSESSMENT: 0-10

## 2024-08-07 ASSESSMENT — ENCOUNTER SYMPTOMS
LOSS OF SENSATION IN FEET: 1
OCCASIONAL FEELINGS OF UNSTEADINESS: 1
DEPRESSION: 0

## 2024-08-07 NOTE — PROGRESS NOTES
Physical Therapy    Physical Therapy Evaluation and Treatment      Patient Name: Vivian Tavarez  MRN: [id number]  Today's Date: 8/7/2024  Visit #1    Time Entry:   Time Calculation  Start Time: 0230  Stop Time: 0315  Time Calculation (min): 45 min  PT Evaluation Time Entry  PT Evaluation (Low) Time Entry: 30  PT Therapeutic Procedures Time Entry  Therapeutic Exercise Time Entry: 15                   Assessment:  Neck pain and cervical radiculopathy since April 2024  Hx of c-spine surgery 2005. Pt has radicular pain in L UE to fingers, she has pain and decreased ROM and strength and impaired function .  PT Assessment  PT Assessment Results: Decreased strength, Decreased range of motion, Pain, Orthopedic restrictions  Rehab Prognosis: Good     Plan:  OP PT Plan  Treatment/Interventions: Cryotherapy, Education/ Instruction, Manual therapy, Neuromuscular re-education, Therapeutic exercises, Therapeutic activities, Ultrasound, Hot pack  PT Plan: Skilled PT  PT Frequency: 2 times per week  Duration: 4-6 week  Onset Date: 04/01/24  Certification Period Start Date: 08/07/24  Certification Period End Date: 11/07/24  Number of Treatments Authorized: auth requested  Rehab Potential: Good  Plan of Care Agreement: Patient    Current Problem:   1. Cervical radiculopathy  Referral to Physical Therapy    Follow Up In Physical Therapy          Subjective   neck pain x 4 months ( April 2024)  Hx of 2005 surgery cpine  Pain lateral neck  Pain turning head  Pain down left arm shoulder to elbow and fingers #4,5  left hand    General:  General  Reason for Referral: cervical radicopathy  Referred By: SAL Goyal PA-C  Past Medical History Relevant to Rehab: cervcial surgery 2005, with hardware, hx of uterine cancer., hx of knee pains, fall risk, has had lymphedema OT for legs  General Comment: Mod fall risk  Precautions:  Precautions  STEADI Fall Risk Score (The score of 4 or more indicates an increased risk of falling): 7 ( no  falls)  Precautions Comment: hx of cpine surgery, hx of cancer, mod fall risk, uses cane or walker, needs wedge if lay down       Pain:  Pain Assessment  Pain Assessment: 0-10  0-10 (Numeric) Pain Score: 2 (2-8/10 left arm to fingers)  Pain Location: Neck  Pain Orientation: Left  Pain Descriptors: Radiating, Numbness, Tightness  Pain Frequency: Constant/continuous  Clinical Progression: Gradually worsening  Effect of Pain on Daily Activities: limits her ability to exercise at gym, kitchen and doing dishes  and cooking  Patient's Stated Pain Goal:  (reduce pain in neck and left arm and fingers. wants to return to gym)          Objective      General Assessments:  Name and  confirmed  Dx Cervical radiculopathy   She has had left elbow and neck pain radiating though the left arm and hand since 2024. She also developed numbness in the left 4-5 digits with  weakness. Hx of previous c-spine surgery . She had prior history of left carpal tunnel release surgery.      Functional Assessments:  Amb with cane for 1 year  No falls, per Steadi score 7 she is Mod fall risk    Extremity/Trunk Assessments:    AROM shoulder ( seated)  Flex Left 65  ( pain )   Rt 115   ABD left 55 ( pain )   Rt 135  ER  left 20   Rt 25  IR  Left buttock  Rt L1  Elbow ext left 15 flexed  Rt 10 flexed  Flexion WNL pain left  Supinate WNL  left pain     Cervical AROM  Flex 50%   pain  left neck  Side bend left 25   rt 20 ( pain )   Rotate  left 50   22 rt ( pain )     MMT  Shoulder flex  left 3/5   rt 4-/5  Shoulder scaption  left 3+/5  rt 4-/5  Elbow flex 4-/5  Limited shoulder MMT due to neck pain    Sleeps with wedge +pillow for head ( does not like to lay down due to hernia )     US :   There was no ultrasound evidence of ulnar neuropathy at the elbow or median neuropathy at the wrist. The ulnar and median nerves were followed through their anatomic course in the limb from wrist to midarm and were otherwise normal.     The mild  enlargement of the left median nerve at the wrist and forearm could be due to the patient's BMI or history of prior carpal tunnel decompression with residual findings. However, there was no evidence of ongoing compression of the median nerve at the wrist.     The other visualized osseous, ligamentous and tendinous structures appeared normal.       Outcome Measures:  NDI 25  = 50%     Treatments: hx of cancer/ no estim.  Hx of c-spine surgery with hardware  EXERCISES       Date 8-7-24      VISIT# #1 # # #    REPS REPS REPS REPS   Pulleys  Flex, scaption              Supine with wedge and pillow  Wand ex for shoulders flex , scaption Seated 10 x   10 x              Cervical  side bend stretch  Cervical retraction  Cervical retract with assist   10 x   10 x       Scapular retraction 10 x                                   Heat        US        Manual        Posture education Initiated       HEP [id number]       Access Code: [email or web address]  URL: [email or web address]  Date: 08/07/2024  Prepared by: Kendra Ulrich    Exercises  - Seated Passive Cervical Retraction  - 1 x daily - 7 x weekly - 10 reps - 5 hold  - Seated Chin Tuck with Neck Elongation  - 1 x daily - 7 x weekly - 10 reps - 5 hold  - Seated Scapular Retraction  - 1 x daily - 7 x weekly - 10 reps - 5 hold  - Supine Shoulder Flexion AAROM with Dowel  - 1 x daily - 7 x weekly - 10 reps  - Seated Shoulder Flexion AAROM with Dowel  - 1 x daily - 7 x weekly - 10 reps  - Shoulder Scaption AAROM with Dowel  - 1 x daily - 7 x weekly - 10 reps    EDUCATION:  Outpatient Education  Plan of Care Discussed and Agreed Upon: yes  Patient Response to Education: Patient/Caregiver Verbalized Understanding of Information    Goals:  Active       PT Problem       PT Goal        Start:  08/07/24    Expected End:  08/28/24       Patient to be independent with home exercises for cervical and scapular area to increase ROM and stabilization for increased function    Patient  to demonstrate awareness of neutral alignment for posture and understand strategies to use for sleeping, standing , sitting and functional activities to reduce symptoms and / or radiculopathy           PT Goal        Start:  08/07/24    Expected End:  09/18/24       Pt pain to reduce 50% neck pain    Patient to demonstrate increased strength by 1/3 MMT grade in areas of weakness for increased function and reduced pain    Patient to have NDI of 4 points or more improvement for increased functional mobility

## 2024-08-20 ENCOUNTER — TREATMENT (OUTPATIENT)
Dept: PHYSICAL THERAPY | Facility: HOSPITAL | Age: 64
End: 2024-08-20
Payer: COMMERCIAL

## 2024-08-20 DIAGNOSIS — M54.12 CERVICAL RADICULOPATHY: ICD-10-CM

## 2024-08-20 PROCEDURE — 97110 THERAPEUTIC EXERCISES: CPT | Mod: GP,CQ

## 2024-08-20 ASSESSMENT — PAIN SCALES - GENERAL: PAINLEVEL_OUTOF10: 4

## 2024-08-20 ASSESSMENT — PAIN - FUNCTIONAL ASSESSMENT: PAIN_FUNCTIONAL_ASSESSMENT: 0-10

## 2024-08-20 NOTE — PROGRESS NOTES
Physical Therapy    Physical Therapy Treatment    Patient Name: Vivian Tavarez  MRN: [id number]  : 1960   Today's Date: 2024  Time Calculation  Start Time: 317  Stop Time: 357  Time Calculation (min): 40 min     PT Therapeutic Procedures Time Entry  Therapeutic Exercise Time Entry: 40                 Visit Number: 3/30  Auth Dates: 24 - 24    Current Problem  Problem List Items Addressed This Visit             ICD-10-CM    Cervical radiculopathy M54.12        Subjective   General  Pt states she has pain in her neck radiating between her shoulder blades. Pt states she lost her HEP paper but she goes to the gym regularly.   Precautions  Precautions  Precautions Comment: hx of cpine surgery, hx of cancer, mod fall risk, uses cane or walker, needs wedge if lay down    Pain  Pain Assessment: 0-10  0-10 (Numeric) Pain Score: 4  Pain Location: Neck  Pain Radiating Towards: upper back and down LLE      Objective     Treatments:     EXERCISES       Date 24     VISIT# #1 #2  # #    REPS REPS REPS REPS   Pulleys  Flex, scaption  Flex 3 min            Supine with wedge and pillow  Wand ex for shoulders flex , scaption Seated 10 x   10 x              Cervical  side bend stretch  Cervical retraction  Cervical retract with assist   10 x   10 x    5sec x10     Scapular retraction 10 x  5sec x10            Ulnar nerve stretch   Modified [id number] El                   Heat        US        Manual        Posture education Initiated  Reviewed      HEP [id number] Reviewed and provided new copy.        Assessment:   Pt tolerated all exercises well with minimal difficulty reporting no change in pain throughout session. Pt demonstrates good understanding of HEP with cane exercises adjusted to seated.     Plan:   Continue to improve UE and neck strength to improve functional abilities and decrease pain.     Goals:  Active       PT Problem       PT Goal        Start:  24    Expected End:  24        Patient to be independent with home exercises for cervical and scapular area to increase ROM and stabilization for increased function    Patient to demonstrate awareness of neutral alignment for posture and understand strategies to use for sleeping, standing , sitting and functional activities to reduce symptoms and / or radiculopathy           PT Goal        Start:  08/07/24    Expected End:  09/18/24       Pt pain to reduce 50% neck pain    Patient to demonstrate increased strength by 1/3 MMT grade in areas of weakness for increased function and reduced pain    Patient to have NDI of 4 points or more improvement for increased functional mobility

## 2024-08-22 ENCOUNTER — TREATMENT (OUTPATIENT)
Dept: PHYSICAL THERAPY | Facility: HOSPITAL | Age: 64
End: 2024-08-22
Payer: COMMERCIAL

## 2024-08-22 DIAGNOSIS — M54.12 CERVICAL RADICULOPATHY: ICD-10-CM

## 2024-08-22 PROCEDURE — 97140 MANUAL THERAPY 1/> REGIONS: CPT | Mod: GP,CQ | Performed by: PHYSICAL THERAPY ASSISTANT

## 2024-08-22 PROCEDURE — 97110 THERAPEUTIC EXERCISES: CPT | Mod: GP,CQ | Performed by: PHYSICAL THERAPY ASSISTANT

## 2024-08-22 ASSESSMENT — PAIN - FUNCTIONAL ASSESSMENT: PAIN_FUNCTIONAL_ASSESSMENT: 0-10

## 2024-08-22 ASSESSMENT — PAIN SCALES - GENERAL: PAINLEVEL_OUTOF10: 8

## 2024-08-22 NOTE — PROGRESS NOTES
Physical Therapy    Physical Therapy Treatment    Patient Name: Vivian Tavarez  MRN: [id number]  : 1960  Today's Date: 2024  Time Calculation  Start Time: 145  Stop Time: 241  Time Calculation (min): 56 min    PT Therapeutic Procedures Time Entry  Manual Therapy Time Entry: 10  Therapeutic Exercise Time Entry: 36  PT Modalities Time Entry  Hot/Cold Pack Time Entry: 10       VISIT:# 3    Current Problem  Problem List Items Addressed This Visit             ICD-10-CM    Cervical radiculopathy M54.12        Subjective      Pt reports she had a bad day yesterday, increased shldr pain el, had trouble getting BS up as well. Pt states she still went to gym yesterday but didn't do as much.  Precautions  Precautions  Precautions Comment: hx of cpine surgery, hx of cancer, mod fall risk, uses cane or walker, needs wedge if lay down       Pain  Pain Assessment: 0-10  0-10 (Numeric) Pain Score: 8  Pain Location: Neck  Pain Radiating Towards: shoulders       Objective       Pt brought in TENS unit for review of use.  Significant Tender to touch right upper trap.     Treatments:  EXERCISES       Date 24    VISIT# #1 #2  #3 #    REPS REPS REPS REPS   Pulleys  Flex, scaption  Flex 3 min     Nustep   L2 x 5' for warm up  pulleys in use    Supine with wedge and pillow  Wand ex for shoulders flex , scaption Seated 10 x   10 x   Seated  10x each           Cervical  side bend stretch  Cervical retraction  Cervical retract with assist   10 x   10 x    5sec x10 5 sec x 10  5 sec x 10      Scapular retraction 10 x  5sec x10 5 sec x 10           Ulnar nerve stretch   Modified [id number] El                   Heat        US        Manual    10' STM/TPR both upper traps    Posture education Initiated  Reviewed      HEP [id number] Reviewed and provided new copy.  Educated on use of personal TENS unit        Assessment:      Pt reports decreased pain to 6/10 post manual therapy. Several TP's noted in right>left  upper trap. Pt educated on use of personal TENS unit.     Plan:   Assess response to manual therapy and progress accordingly.    Goals:  Active       PT Problem       PT Goal        Start:  08/07/24    Expected End:  08/28/24       Patient to be independent with home exercises for cervical and scapular area to increase ROM and stabilization for increased function    Patient to demonstrate awareness of neutral alignment for posture and understand strategies to use for sleeping, standing , sitting and functional activities to reduce symptoms and / or radiculopathy           PT Goal        Start:  08/07/24    Expected End:  09/18/24       Pt pain to reduce 50% neck pain    Patient to demonstrate increased strength by 1/3 MMT grade in areas of weakness for increased function and reduced pain    Patient to have NDI of 4 points or more improvement for increased functional mobility

## 2024-08-27 ENCOUNTER — TREATMENT (OUTPATIENT)
Dept: PHYSICAL THERAPY | Facility: HOSPITAL | Age: 64
End: 2024-08-27
Payer: COMMERCIAL

## 2024-08-27 DIAGNOSIS — M54.12 CERVICAL RADICULOPATHY: ICD-10-CM

## 2024-08-27 PROCEDURE — 97110 THERAPEUTIC EXERCISES: CPT | Mod: GP | Performed by: PHYSICAL THERAPIST

## 2024-08-27 PROCEDURE — 97035 APP MDLTY 1+ULTRASOUND EA 15: CPT | Mod: GP | Performed by: PHYSICAL THERAPIST

## 2024-08-27 ASSESSMENT — PAIN DESCRIPTION - DESCRIPTORS: DESCRIPTORS: RADIATING

## 2024-08-27 ASSESSMENT — PAIN SCALES - GENERAL: PAINLEVEL_OUTOF10: 8

## 2024-08-27 ASSESSMENT — PAIN - FUNCTIONAL ASSESSMENT: PAIN_FUNCTIONAL_ASSESSMENT: 0-10

## 2024-08-27 NOTE — PROGRESS NOTES
Physical Therapy    Physical Therapy    Physical Therapy Treatment      Patient Name: Vivian Tavarez  MRN: [id number]  Today's Date: 2024  Visit # 5  Time Calculation  Start Time: 0100  Stop Time: 0145  Time Calculation (min): 45 min      Subjective    Running late today with traffic  Blood sugar levels dropped last night , have been tired  Pt has had 2 weeks of BS issues. Pt is advised to contact her MD and discuss this  And pt agrees .       Precautions  Precautions  Precautions Comment: hx of cpine surgery, hx of cancer, mod fall risk, uses cane or walker, needs wedge if lay down    Current Problem:   1. Cervical radiculopathy  Follow Up In Physical Therapy          Pain:  Pain Assessment  Pain Assessment: 0-10  0-10 (Numeric) Pain Score: 8  Pain Type: Chronic pain  Pain Location: Neck (radicular arm to fingers left)  Pain Radiating Towards: shoulders  Pain Descriptors: Radiating  Pain Frequency: Intermittent      Objective         Name and  confirmed  Upper trap pain, provided heat and stretch and US to area  Scapular strengthening for posture       Dx Cervical radiculopathy   She has had left elbow and neck pain radiating though the left arm and hand since 2024. She also developed numbness in the left 4-5 digits with  weakness. Hx of previous c-spine surgery . She had prior history of left carpal tunnel release surgery.      Functional Assessments:  Amb with cane for 1 year  No falls, per Steadi score 7 she is Mod fall risk       neck pain x 4 months ( 2024)  Hx of 2005 surgery cpine  Pain lateral neck  Pain turning head  Pain down left arm shoulder to elbow and fingers #4,5  left hand      Treatments:  Significant Tender to touch right upper trap.  Treatments:  EXERCISES           Date 24   VISIT# #1 #2  #3 #4     REPS REPS REPS REPS   Pulleys  Flex, scaption   Flex 3 min    2 min   Nustep     L2 x 5' for warm up  pulleys in use  NP   Supine with wedge  and pillow  Wand ex for shoulders flex , scaption Seated 10 x   10 x    Seated  10x each  HEP               Cervical  side bend stretch  Cervical retraction  Cervical retract with assist    10 x   10 x     5sec x10 5 sec x 10  5 sec x 10     10 sec x 3   10  x 5 sec  5 sec x 10      Scapular retraction 10 x  5sec x10 5 sec x 10  10 x 5 sec               Ulnar nerve stretch    Modified [id number] El        seated Tband ex  Row  Lat pull down  El UE ER with 2 hands on band          Green 20 x  Green 20 x  GRN 20 x               Heat         10 min while ex   US         lateral Rt neck @   1 .3 W/Cm2    Manual      10' STM/TPR both upper traps     Posture education Initiated  Reviewed        HEP [id number] Reviewed and provided new copy.  Educated on use of personal TENS unit                Assessment:  progressed scapular strengthening today. Performed heat and US to neck with stretches     Plan: assess how tolerated the new ex and modalities     Goals:  Active       PT Problem       PT Goal        Start:  08/07/24    Expected End:  08/28/24       Patient to be independent with home exercises for cervical and scapular area to increase ROM and stabilization for increased function    Patient to demonstrate awareness of neutral alignment for posture and understand strategies to use for sleeping, standing , sitting and functional activities to reduce symptoms and / or radiculopathy           PT Goal        Start:  08/07/24    Expected End:  09/18/24       Pt pain to reduce 50% neck pain    Patient to demonstrate increased strength by 1/3 MMT grade in areas of weakness for increased function and reduced pain    Patient to have NDI of 4 points or more improvement for increased functional mobility

## 2024-08-29 ENCOUNTER — TREATMENT (OUTPATIENT)
Dept: PHYSICAL THERAPY | Facility: HOSPITAL | Age: 64
End: 2024-08-29
Payer: COMMERCIAL

## 2024-08-29 DIAGNOSIS — M54.12 CERVICAL RADICULOPATHY: ICD-10-CM

## 2024-08-29 PROCEDURE — 97035 APP MDLTY 1+ULTRASOUND EA 15: CPT | Mod: GP,CQ

## 2024-08-29 PROCEDURE — 97110 THERAPEUTIC EXERCISES: CPT | Mod: GP,CQ

## 2024-08-29 ASSESSMENT — PAIN DESCRIPTION - DESCRIPTORS: DESCRIPTORS: RADIATING

## 2024-08-29 ASSESSMENT — PAIN - FUNCTIONAL ASSESSMENT: PAIN_FUNCTIONAL_ASSESSMENT: 0-10

## 2024-08-29 ASSESSMENT — PAIN SCALES - GENERAL: PAINLEVEL_OUTOF10: 5 - MODERATE PAIN

## 2024-08-29 NOTE — PROGRESS NOTES
"Physical Therapy    Physical Therapy Treatment      Patient Name: Vivian Tavarez  MRN: [id number]  Today's Date: 8/29/2024  Visit # 6  Time Calculation  Start Time: 0100  Stop Time: 0145  Time Calculation (min): 45 min    US: 8 min  TE: 37 min    Subjective    Pt reported her blood sugar dropped to 58 last night around 3 am. It can happen anytime throughout the day but it happens often at this time. She has contacted physician and the earliest she can get in is mid September. She felt pretty good after the last session but she noted she did have to take pain medication and m. Relaxer post therapy but that may be related to her pain earlier that day.       Precautions  Precautions  Precautions Comment: hx of cpine surgery, hx of cancer, mod fall risk, uses cane or walker, needs wedge if lay down    Current Problem:   1. Cervical radiculopathy  Follow Up In Physical Therapy          Pain:  Pain Assessment  Pain Assessment: 0-10  0-10 (Numeric) Pain Score: 5 - Moderate pain  Pain Type: Chronic pain  Pain Location: Neck  Pain Radiating Towards: shoulders (8/10)  Pain Descriptors: Radiating  L>R    Objective        Treatments:  Significant Tender to touch right upper trap.  Treatments:  EXERCISES            Date 8-7-24 8/20/2024 8/22/24 8/27/24 8/29/24   VISIT# #1 #2  #3 #4 #5     REPS REPS REPS REPS REPS   Pulleys  Flex, scaption   Flex 3 min    2 min 3'  1'   Nustep     L2 x 5' for warm up  pulleys in use  NP    Supine with wedge and pillow  Wand ex for shoulders flex , scaption Seated 10 x   10 x    Seated  10x each  HEP                 Cervical  side bend stretch  Cervical retraction  Cervical retract with assist    10 x   10 x     5sec x10 5 sec x 10  5 sec x 10     10 sec x 3   10  x 5 sec  5 sec x 10    10 sec x 5    5 sec x 10    Scapular retraction 10 x  5sec x10 5 sec x 10  10 x 5 sec 10x5\"                Ulnar nerve stretch    Modified [id number] El         seated Tband ex  Row  Lat pull down  El UE ER with 2 " hands on band          Green 20 x  Green 20 x  GRN 20 x   Green 20x  Green 20x  Green 20x                  Heat         10 min while ex 10' with ex   US         lateral Rt neck @   1 .3 W/Cm2  Lateral right neck/shoulder 1.0 mhz 1.0 w/cm3 50%   Manual      10' STM/TPR both upper traps      Posture education Initiated  Reviewed         HEP [id number] Reviewed and provided new copy.  Educated on use of personal TENS unit                 Assessment:  Pt felt increased pain in R shoulder with scaption on pulleys today. Pt unable to tolerate US with prior parameters due to sensitivity this session, reduced duty cycle and patient able to tolerate. El. Shoulder pain reduced to 3/10      Plan: Progress as tolerated  Goals:  Active       PT Problem       PT Goal        Start:  08/07/24    Expected End:  08/28/24       Patient to be independent with home exercises for cervical and scapular area to increase ROM and stabilization for increased function    Patient to demonstrate awareness of neutral alignment for posture and understand strategies to use for sleeping, standing , sitting and functional activities to reduce symptoms and / or radiculopathy           PT Goal        Start:  08/07/24    Expected End:  09/18/24       Pt pain to reduce 50% neck pain    Patient to demonstrate increased strength by 1/3 MMT grade in areas of weakness for increased function and reduced pain    Patient to have NDI of 4 points or more improvement for increased functional mobility

## 2024-09-03 ENCOUNTER — TREATMENT (OUTPATIENT)
Dept: PHYSICAL THERAPY | Facility: HOSPITAL | Age: 64
End: 2024-09-03
Payer: COMMERCIAL

## 2024-09-03 DIAGNOSIS — M54.12 CERVICAL RADICULOPATHY: ICD-10-CM

## 2024-09-03 PROCEDURE — 97110 THERAPEUTIC EXERCISES: CPT | Mod: GP,CQ

## 2024-09-03 PROCEDURE — 97035 APP MDLTY 1+ULTRASOUND EA 15: CPT | Mod: GP,CQ

## 2024-09-03 ASSESSMENT — PAIN DESCRIPTION - DESCRIPTORS: DESCRIPTORS: RADIATING

## 2024-09-03 ASSESSMENT — PAIN - FUNCTIONAL ASSESSMENT: PAIN_FUNCTIONAL_ASSESSMENT: 0-10

## 2024-09-03 ASSESSMENT — PAIN SCALES - GENERAL: PAINLEVEL_OUTOF10: 8

## 2024-09-03 NOTE — PROGRESS NOTES
"Physical Therapy    Physical Therapy Treatment      Patient Name: Vivian Tavarez  MRN: [id number]  Today's Date: 9/3/2024  Visit # 7  Time Calculation  Start Time: 0100  Stop Time: 0140  Time Calculation (min): 40 min    US: 8 min  TE: 37 min    Subjective       Pt reported her left shoulder and elbow are hurting today, normally her right shoulder has increased pain but it is very low today.    Precautions   hx of cpine surgery, hx of cancer, mod fall risk, uses cane or walker, needs wedge if lay down     Current Problem:   1. Cervical radiculopathy  Follow Up In Physical Therapy          Pain:  Pain Assessment  Pain Assessment: 0-10  0-10 (Numeric) Pain Score: 8  Pain Type: Chronic pain  Pain Location: Neck  Pain Radiating Towards: shoulders (R<L)  Pain Descriptors: Radiating      Objective    Attempted cervical isometrics    Treatments:  Significant Tender to touch right upper trap.  Treatments:  EXERCISES             Date 8-7-24 8/20/2024 8/22/24  8/27/24 8/29/24 9/3/24   VISIT# #1 #2  #3 #4 #5 #6     REPS REPS REPS REPS REPS REPS   Pulleys  Flex, scaption   Flex 3 min    2 min 3'  1' 3'  2'   Nustep     L2 x 5' for warm up  pulleys in use  NP     Supine with wedge and pillow  Wand ex for shoulders flex , scaption Seated 10 x   10 x    Seated  10x each  HEP  Seated wand flex  2x10   Cervical isometrics          3x R/L pain   Cervical  side bend stretch  Cervical retraction  Cervical retract with assist    10 x   10 x     5sec x10 5 sec x 10  5 sec x 10     10 sec x 3   10  x 5 sec  5 sec x 10    10 sec x 5    5 sec x 10  10 sec x 5    5 sec x 10    Scapular retraction 10 x  5sec x10 5 sec x 10  10 x 5 sec 10x5\" 10x5\"                 Ulnar nerve stretch    Modified [id number] El          seated Tband ex  Row  Lat pull down  El UE ER with 2 hands on band          Green 20 x  Green 20 x  GRN 20 x   Green 20x  Green 20x  Green 20x     Green 20x  Green 20x  Green 20x                 Heat         10 min while ex 10' " with ex 10' with ex   US         lateral Rt neck @   1 .3 W/Cm2  Lateral right   neck/shoulder 1.0 mhz 1.0 w/cm3 50% 10'  Lateral left neck/shoulder 1.0 mhz 1.0 w/cm3 50%   Manual      10' STM/TPR both upper traps       Posture education Initiated  Reviewed          HEP [id number] Reviewed and provided new copy.  Educated on use of personal TENS unit                  Assessment:  Pt tried to completed gentle isometrics on left and rights sides but felt increase in pain in anatoliy. Upper traps. Pt felt  some relief from US on left upper traps although very tender in areas with minimal pressure. 6/10 pain level at end of session.     Plan: Progress as tolerated  Goals:  Active       PT Problem       PT Goal        Start:  08/07/24    Expected End:  08/28/24       Patient to be independent with home exercises for cervical and scapular area to increase ROM and stabilization for increased function    Patient to demonstrate awareness of neutral alignment for posture and understand strategies to use for sleeping, standing , sitting and functional activities to reduce symptoms and / or radiculopathy           PT Goal        Start:  08/07/24    Expected End:  09/18/24       Pt pain to reduce 50% neck pain    Patient to demonstrate increased strength by 1/3 MMT grade in areas of weakness for increased function and reduced pain    Patient to have NDI of 4 points or more improvement for increased functional mobility

## 2024-09-09 ENCOUNTER — TREATMENT (OUTPATIENT)
Dept: PHYSICAL THERAPY | Facility: HOSPITAL | Age: 64
End: 2024-09-09
Payer: COMMERCIAL

## 2024-09-09 DIAGNOSIS — M54.12 CERVICAL RADICULOPATHY: ICD-10-CM

## 2024-09-09 PROCEDURE — 97110 THERAPEUTIC EXERCISES: CPT | Mod: GP | Performed by: PHYSICAL THERAPIST

## 2024-09-09 PROCEDURE — 97035 APP MDLTY 1+ULTRASOUND EA 15: CPT | Mod: GP | Performed by: PHYSICAL THERAPIST

## 2024-09-09 ASSESSMENT — PAIN DESCRIPTION - DESCRIPTORS: DESCRIPTORS: RADIATING

## 2024-09-09 ASSESSMENT — PAIN SCALES - GENERAL: PAINLEVEL_OUTOF10: 6

## 2024-09-09 ASSESSMENT — PAIN - FUNCTIONAL ASSESSMENT: PAIN_FUNCTIONAL_ASSESSMENT: 0-10

## 2024-09-09 NOTE — PROGRESS NOTES
Physical Therapy    Physical Therapy    Physical Therapy Treatment      Patient Name: Vivian Tavarez  MRN: [id number]  Today's Date: 2024  Visit # 8  Time Calculation  Start Time: 145  Stop Time: 230  Time Calculation (min): 45 min      Subjective    Pt has headache today. Left temporal 6/10. No blurred vision. Neck 6/10.  Shoulders 8/10  Pain rt upper arm. Fingers are not numb. Sometimes symptoms are in left arm   States she had recent BS issues and will see MD   Pt is independent with home exercises  Would like to self manage with home exercises  and hopes to get MRI  Would like to be on hold while she follows up with her provider           Precautions  Precautions  Precautions Comment: hx of cpine surgery, hx of cancer, mod fall risk, uses cane or walker, needs wedge if lay down    Current Problem:   1. Cervical radiculopathy  Follow Up In Physical Therapy          Pain:  Pain Assessment  Pain Assessment: 0-10  0-10 (Numeric) Pain Score: 6  Pain Type: Chronic pain  Pain Location: Neck  Pain Radiating Towards: shoulder Rt  Pain Descriptors: Radiating      Objective         Name and  confirmed  Upper trap pain, provided heat and stretch and US to area  Scapular strengthening for posture         Dx Cervical radiculopathy   Hx of previous c-spine surgery . She had prior history of left carpal tunnel release surgery.      Functional Assessments:  Amb with cane for 1 year  No falls, per Steadi score 7 she is Mod fall risk      neck pain x 4 months ( 2024)  Hx of 2005 surgery cpine  + TTP upper traps El  Cervical stiffness    Outcome Measures:  NDI 21  = 42%         Treatments:  Significant Tender to touch right upper trap.  Treatments:  EXERCISES        Date 8/29/24 9/3/24 9-9-24   VISIT# #5 #6 #7     REPS REPS    Pulleys  Flex, scaption 3'  1' 3'  2' 3'  2'   Nustep        Supine with wedge and pillow  Wand ex for shoulders flex , scaption   Seated wand flex  2x10 HEP   Cervical isometrics    "3x R/L pain Hold off    Cervical  side bend stretch  Cervical retraction  Cervical retract with assist 10 sec x 5     5 sec x 10  10 sec x 5     5 sec x 10  10sec x 3    5 sec x 10    Scapular retraction 10x5\" 10x5\" 10 x 5\"            Ulnar nerve stretch          seated Tband ex  Row  Lat pull down  El UE ER with 2 hands on band    Green 20x  Green 20x  Green 20x       Green 20x  Green 20x  Green 20x   Green 20x  Green 20 x  Green 20 x             Heat  10' with ex 10' with ex 10' with ex   US  Lateral right   neck/shoulder 1.0 mhz 1.0 w/cm3 50% 10'  Lateral left neck/shoulder 1.0 mhz 1.0 w/cm3 50% 8 min neck  1.0MHz  1.0 w/cm3  Lightly but sensitive to touch    Manual      Prefers no STM    Posture education        HEP     HEP  [id number]              Assessment: continues to have neck pain and symptoms that fluctuate in shoulders . Relief with Heat, independent with ex . Cervical isometrics placed on hold due to discomfort. US does not provide relief and so DC this.  Continues to be sensitive to touch in upper traps el.      Plan:  hold PT while pt self manage with home exercises, pt may call to return if needed in the next 2 weeks. If no return in 2 weeks we will discharge you.     Goals:  Active       PT Problem       PT Goal  (Met)       Start:  08/07/24    Expected End:  09/09/24    Resolved:  09/09/24    Patient to be independent with home exercises for cervical and scapular area to increase ROM and stabilization for increased function  GOAL MET    Patient to demonstrate awareness of neutral alignment for posture and understand strategies to use for sleeping, standing , sitting and functional activities to reduce symptoms and / or radiculopathy   GOAL MET           PT Goal  (Progressing)       Start:  08/07/24    Expected End:  09/18/24       Pt pain to reduce 50% neck pain   Not met    Patient to demonstrate increased strength by 1/3 MMT grade in areas of weakness for increased function and reduced pain   " MET    Patient to have NDI of 4 points or more improvement for increased functional mobility    progressed

## 2024-09-23 ENCOUNTER — APPOINTMENT (OUTPATIENT)
Dept: ORTHOPEDIC SURGERY | Facility: CLINIC | Age: 64
End: 2024-09-23
Payer: COMMERCIAL

## 2024-09-23 VITALS — HEIGHT: 66 IN | WEIGHT: 280 LBS | BODY MASS INDEX: 45 KG/M2

## 2024-09-23 DIAGNOSIS — R27.0 ATAXIA: ICD-10-CM

## 2024-09-23 DIAGNOSIS — M54.12 CERVICAL RADICULOPATHY: Primary | ICD-10-CM

## 2024-09-23 PROCEDURE — 99214 OFFICE O/P EST MOD 30 MIN: CPT | Performed by: PHYSICIAN ASSISTANT

## 2024-09-23 PROCEDURE — 1036F TOBACCO NON-USER: CPT | Performed by: PHYSICIAN ASSISTANT

## 2024-09-23 PROCEDURE — 3008F BODY MASS INDEX DOCD: CPT | Performed by: PHYSICIAN ASSISTANT

## 2024-09-23 ASSESSMENT — PAIN - FUNCTIONAL ASSESSMENT: PAIN_FUNCTIONAL_ASSESSMENT: 0-10

## 2024-09-23 NOTE — PROGRESS NOTES
Vivian returns to clinic today for reevaluation of her left cervical radiculopathy.    She has a history of a previous C4-6 ACDF completed in 2005 in [facility].    6/24/2024: Symptoms started approximately 7 months ago, she denies injury or trauma.  Her symptoms started with a stabbing pain in her left elbow, this slowly progressed to pain and numbness radiating from her neck down into her ring and pinky finger.  Her left arm feels weak.  She has noticed a change in her balance, she has been ambulating with a cane due to feeling unsteady.  No recent change in her fine motor skills or handwriting.  She is not dropping things more frequently.  No recent falls.  She has full control of her bowel and bladder.    She takes Percocet, gabapentin, and Flexeril.  She is an established patient with pain management.    9/23/2024: Since I last saw her she is having increased difficulty with her balance, she states she feels drunk when walking.  She continues to have neck pain with pain rating down her left arm.  She went to physical therapy at the  in [place], her first appointment was 8/7.  Her most recent appointment was 9/9, she went twice weekly for 7 sessions.  She feels her symptoms are worsening with physical therapy.    Family, social, and medical histories are obtained and reviewed.  Type II diabetic, last hemoglobin A1c was 6.5, this was in 2022.    ROS: All other systems have been reviewed and are negative except as previously noted in history of present illness.     Physical Exam:  Const: Well-appearing, well-nourished overweight female in no distress.  Eyes: Normal appearing sclera and conjunctiva, no jaundice, pupils normal in appearance.  Neck: No masses or lymphadenopathy appreciated.  Resp: breathing comfortably, normal respiratory rate rate.  CV: No upper or lower extremity edema.  Musculoskeletal: Normal gait.  Unsteady tandem gait.  Cervical ROM is supple.  Strength exam of the upper extremities  reveals 5/5 strength in all major muscle groups.  No intrinsic wasting.  Negative Spurling sign.    Neuro: Sensation is intact and equal bilaterally. Deep tendon reflexes are normal and symmetric.  No clonus, negative Blackwood sign, negative Lhermitte's sign  Skin: Intact without any lesions, normal turgor.  Psych: Alert and oriented x3, normal mood and affect.    The plan moving forward is to further evaluate her ataxic gait and left arm radiculopathy that has failed to improve with conservative treatment.  This is medically necessary to assess for spinal cord compression, especially in the setting of ataxia.  She will follow-up with me once her MRI is complete to discuss further treatment options, possibly including injections versus surgical intervention.    **This note was dictated using speech recognition software and was not corrected for spelling or grammatical errors**

## 2024-10-14 ENCOUNTER — HOSPITAL ENCOUNTER (OUTPATIENT)
Dept: RADIOLOGY | Facility: HOSPITAL | Age: 64
Discharge: HOME | End: 2024-10-14
Payer: COMMERCIAL

## 2024-10-14 DIAGNOSIS — M54.12 CERVICAL RADICULOPATHY: ICD-10-CM

## 2024-10-14 DIAGNOSIS — R27.0 ATAXIA: ICD-10-CM

## 2024-10-14 PROCEDURE — 72141 MRI NECK SPINE W/O DYE: CPT

## 2024-10-14 PROCEDURE — 72141 MRI NECK SPINE W/O DYE: CPT | Performed by: RADIOLOGY

## 2024-10-18 ENCOUNTER — DOCUMENTATION (OUTPATIENT)
Dept: PHYSICAL THERAPY | Facility: HOSPITAL | Age: 64
End: 2024-10-18
Payer: COMMERCIAL

## 2024-10-18 NOTE — PROGRESS NOTES
Physical Therapy    Discharge Summary    Name: Vivian Tavarez  MRN: [id number]  : 1960  Date: 10/18/24    Discharge Summary: PT  Date of PT eval 24  DATE of last visit 24   Total visit #8  Date of DC 10-18-24  Pt plan of care had

## 2024-10-22 ENCOUNTER — TRANSCRIBE ORDERS (OUTPATIENT)
Dept: ADMINISTRATIVE | Age: 64
End: 2024-10-22

## 2024-10-22 DIAGNOSIS — Z12.31 ENCOUNTER FOR SCREENING MAMMOGRAM FOR MALIGNANT NEOPLASM OF BREAST: Primary | ICD-10-CM

## 2024-11-25 ENCOUNTER — APPOINTMENT (OUTPATIENT)
Facility: CLINIC | Age: 64
End: 2024-11-25
Payer: COMMERCIAL

## 2024-11-25 PROCEDURE — 1159F MED LIST DOCD IN RCRD: CPT | Performed by: PHYSICIAN ASSISTANT

## 2024-11-25 PROCEDURE — 1160F RVW MEDS BY RX/DR IN RCRD: CPT | Performed by: PHYSICIAN ASSISTANT

## 2024-11-25 PROCEDURE — 1036F TOBACCO NON-USER: CPT | Performed by: PHYSICIAN ASSISTANT

## 2024-11-25 PROCEDURE — 99442 PR PHYS/QHP TELEPHONE EVALUATION 11-20 MIN: CPT | Performed by: PHYSICIAN ASSISTANT

## 2024-11-25 NOTE — PROGRESS NOTES
A telephone visit (audio only) between the patient (at the originating site) and the provider (at the distant site) was utilized to provide the telehealth service.  Verbal consent was requested and obtained from Vivian Tavarez on this date, 11/25 at 0845, for a telehealth visit.  FUV    This phone call today is to review Vivian's MRI results.    She continues to have symptoms of left arm radiculopathy.  She failed conservative treatment with physical therapy.  She is established with pain management but has only tried medications, no cervical injections.  She is ambulating with a cane.  No recent falls or injuries.    ROS: All other systems have been reviewed and are negative except as previously noted in history of present illness.    Physical exam not completed due to telephone visit.    I personally reviewed an MRI of the cervical spine completed on 10/14. There is no evidence of high-grade central canal stenosis or spinal cord compression.  There is varying degrees of left foraminal stenosis, moderate at C6-7.    I discussed with her that the left foraminal stenosis at C6-7 does correlate with her symptoms.  We discussed further treatment options, including continued conservative treatment, injections, and surgical intervention.  At this time I would recommend a trial of epidural injections, she is going to call her pain management provider to set this up.  If she does not have improvement with injections she can follow-up to further discuss surgical intervention.    Total encounter time was 15 minutes.    **This note was dictated using speech recognition software and was not corrected for spelling or grammatical errors**

## 2024-12-09 ENCOUNTER — HOSPITAL ENCOUNTER (OUTPATIENT)
Dept: MAMMOGRAPHY | Age: 64
Discharge: HOME | End: 2024-12-11
Payer: COMMERCIAL

## 2024-12-09 DIAGNOSIS — Z12.31 ENCOUNTER FOR SCREENING MAMMOGRAM FOR MALIGNANT NEOPLASM OF BREAST: ICD-10-CM

## 2024-12-09 PROCEDURE — 77063 BREAST TOMOSYNTHESIS BI: CPT

## (undated) DEVICE — TOWEL,OR,DSP,ST,BLUE,STD,6/PK,12PK/CS: Brand: MEDLINE

## (undated) DEVICE — LENS CORD GYN 0-DEG 5 MM CIRCON

## (undated) DEVICE — COVER,LIGHT HANDLE,FLX,1/PK: Brand: MEDLINE INDUSTRIES, INC.

## (undated) DEVICE — GOWN,SIRUS,NONRNF,SETINSLV,XL,20/CS: Brand: MEDLINE

## (undated) DEVICE — LENS 12 DEG CIRCON

## (undated) DEVICE — PACK PROC 3IN1 W/ L12FT DIA0.25IN REINF SUCT TBNG W50XL901IN

## (undated) DEVICE — GAUZE,SPONGE,4"X4",16PLY,XRAY,STRL,LF: Brand: MEDLINE

## (undated) DEVICE — TRAY PROCED HYSTEROSCOPY CIRCON 1

## (undated) DEVICE — LENS 30 DEG CIRCON

## (undated) DEVICE — TRAY PROCED DILATATION CURETTAGE

## (undated) DEVICE — PAD MATERNITY CURITY ADH STRIP DISP

## (undated) DEVICE — MARKER,SKIN,WI/RULER AND LABELS: Brand: MEDLINE

## (undated) DEVICE — CAMERA STRYKER 1488 HD GEN

## (undated) DEVICE — Z INACTIVE USE 2660664 SOLUTION IRRIG 3000ML 0.9% SOD CHL USP UROMATIC PLAS CONT

## (undated) DEVICE — TRAY,VAG PREP,2PR VNYL GLV,4 C: Brand: MEDLINE INDUSTRIES, INC.

## (undated) DEVICE — ELECTRODE MPLR DIA24FR 0.015IN WIRE YEL STR LOOP UROLOGY

## (undated) DEVICE — GLOVE ORANGE PI 7 1/2   MSG9075

## (undated) DEVICE — DOUBLE BASIN SET: Brand: MEDLINE INDUSTRIES, INC.